# Patient Record
Sex: FEMALE | Race: WHITE | NOT HISPANIC OR LATINO | Employment: FULL TIME | URBAN - NONMETROPOLITAN AREA
[De-identification: names, ages, dates, MRNs, and addresses within clinical notes are randomized per-mention and may not be internally consistent; named-entity substitution may affect disease eponyms.]

---

## 2023-07-02 ENCOUNTER — ANESTHESIA (INPATIENT)
Dept: PERIOP | Facility: HOSPITAL | Age: 58
DRG: 419 | End: 2023-07-02
Payer: COMMERCIAL

## 2023-07-02 ENCOUNTER — APPOINTMENT (EMERGENCY)
Dept: CT IMAGING | Facility: HOSPITAL | Age: 58
DRG: 419 | End: 2023-07-02
Payer: COMMERCIAL

## 2023-07-02 ENCOUNTER — ANESTHESIA EVENT (INPATIENT)
Dept: PERIOP | Facility: HOSPITAL | Age: 58
DRG: 419 | End: 2023-07-02
Payer: COMMERCIAL

## 2023-07-02 ENCOUNTER — HOSPITAL ENCOUNTER (INPATIENT)
Facility: HOSPITAL | Age: 58
LOS: 2 days | Discharge: HOME | DRG: 419 | End: 2023-07-04
Attending: EMERGENCY MEDICINE | Admitting: SURGERY
Payer: COMMERCIAL

## 2023-07-02 DIAGNOSIS — R10.11 RUQ ABDOMINAL PAIN: ICD-10-CM

## 2023-07-02 DIAGNOSIS — K80.12 CALCULUS OF GALLBLADDER WITH ACUTE AND CHRONIC CHOLECYSTITIS WITHOUT OBSTRUCTION: ICD-10-CM

## 2023-07-02 DIAGNOSIS — R52 PAIN: ICD-10-CM

## 2023-07-02 DIAGNOSIS — K81.0 ACUTE CHOLECYSTITIS: Primary | ICD-10-CM

## 2023-07-02 PROBLEM — F41.9 ANXIETY: Status: ACTIVE | Noted: 2023-07-02

## 2023-07-02 PROBLEM — E78.00 HIGH CHOLESTEROL: Status: ACTIVE | Noted: 2023-07-02

## 2023-07-02 LAB
ALBUMIN SERPL BCP-MCNC: 4.2 G/DL (ref 3.5–5)
ALP SERPL-CCNC: 69 U/L (ref 34–104)
ALT SERPL W P-5'-P-CCNC: 50 U/L (ref 7–52)
ANION GAP SERPL CALCULATED.3IONS-SCNC: 11 MMOL/L
AST SERPL W P-5'-P-CCNC: 69 U/L (ref 13–39)
BASOPHILS # BLD AUTO: 0.07 THOUSANDS/ÂΜL (ref 0–0.1)
BASOPHILS NFR BLD AUTO: 1 % (ref 0–1)
BILIRUB SERPL-MCNC: 0.65 MG/DL (ref 0.2–1)
BILIRUB UR QL STRIP: NEGATIVE
BUN SERPL-MCNC: 19 MG/DL (ref 5–25)
CALCIUM SERPL-MCNC: 9.2 MG/DL (ref 8.4–10.2)
CHLORIDE SERPL-SCNC: 104 MMOL/L (ref 96–108)
CLARITY UR: CLEAR
CO2 SERPL-SCNC: 23 MMOL/L (ref 21–32)
COLOR UR: YELLOW
CREAT SERPL-MCNC: 0.69 MG/DL (ref 0.6–1.3)
EOSINOPHIL # BLD AUTO: 0.08 THOUSAND/ÂΜL (ref 0–0.61)
EOSINOPHIL NFR BLD AUTO: 1 % (ref 0–6)
ERYTHROCYTE [DISTWIDTH] IN BLOOD BY AUTOMATED COUNT: 14.5 % (ref 11.6–15.1)
GFR SERPL CREATININE-BSD FRML MDRD: 96 ML/MIN/1.73SQ M
GLUCOSE SERPL-MCNC: 122 MG/DL (ref 65–140)
GLUCOSE UR STRIP-MCNC: NEGATIVE MG/DL
HCT VFR BLD AUTO: 42.6 % (ref 34.8–46.1)
HGB BLD-MCNC: 14.1 G/DL (ref 11.5–15.4)
HGB UR QL STRIP.AUTO: NEGATIVE
IMM GRANULOCYTES # BLD AUTO: 0.1 THOUSAND/UL (ref 0–0.2)
IMM GRANULOCYTES NFR BLD AUTO: 1 % (ref 0–2)
KETONES UR STRIP-MCNC: NEGATIVE MG/DL
LEUKOCYTE ESTERASE UR QL STRIP: NEGATIVE
LIPASE SERPL-CCNC: 28 U/L (ref 11–82)
LYMPHOCYTES # BLD AUTO: 1.83 THOUSANDS/ÂΜL (ref 0.6–4.47)
LYMPHOCYTES NFR BLD AUTO: 12 % (ref 14–44)
MCH RBC QN AUTO: 31.7 PG (ref 26.8–34.3)
MCHC RBC AUTO-ENTMCNC: 33.1 G/DL (ref 31.4–37.4)
MCV RBC AUTO: 96 FL (ref 82–98)
MONOCYTES # BLD AUTO: 1.01 THOUSAND/ÂΜL (ref 0.17–1.22)
MONOCYTES NFR BLD AUTO: 7 % (ref 4–12)
NEUTROPHILS # BLD AUTO: 11.86 THOUSANDS/ÂΜL (ref 1.85–7.62)
NEUTS SEG NFR BLD AUTO: 78 % (ref 43–75)
NITRITE UR QL STRIP: NEGATIVE
NRBC BLD AUTO-RTO: 0 /100 WBCS
PH UR STRIP.AUTO: 7.5 [PH]
PLATELET # BLD AUTO: 363 THOUSANDS/UL (ref 149–390)
PMV BLD AUTO: 9 FL (ref 8.9–12.7)
POTASSIUM SERPL-SCNC: 3.7 MMOL/L (ref 3.5–5.3)
PROT SERPL-MCNC: 7.2 G/DL (ref 6.4–8.4)
PROT UR STRIP-MCNC: NEGATIVE MG/DL
RBC # BLD AUTO: 4.45 MILLION/UL (ref 3.81–5.12)
SODIUM SERPL-SCNC: 138 MMOL/L (ref 135–147)
SP GR UR STRIP.AUTO: 1.01 (ref 1–1.03)
UROBILINOGEN UR QL STRIP.AUTO: 1 E.U./DL
WBC # BLD AUTO: 14.95 THOUSAND/UL (ref 4.31–10.16)

## 2023-07-02 PROCEDURE — 81003 URINALYSIS AUTO W/O SCOPE: CPT | Performed by: EMERGENCY MEDICINE

## 2023-07-02 PROCEDURE — 74177 CT ABD & PELVIS W/CONTRAST: CPT

## 2023-07-02 PROCEDURE — 99284 EMERGENCY DEPT VISIT MOD MDM: CPT

## 2023-07-02 PROCEDURE — 99223 1ST HOSP IP/OBS HIGH 75: CPT | Performed by: SURGERY

## 2023-07-02 PROCEDURE — 96368 THER/DIAG CONCURRENT INF: CPT

## 2023-07-02 PROCEDURE — 80053 COMPREHEN METABOLIC PANEL: CPT | Performed by: EMERGENCY MEDICINE

## 2023-07-02 PROCEDURE — 96367 TX/PROPH/DG ADDL SEQ IV INF: CPT

## 2023-07-02 PROCEDURE — 96365 THER/PROPH/DIAG IV INF INIT: CPT

## 2023-07-02 PROCEDURE — 83690 ASSAY OF LIPASE: CPT | Performed by: EMERGENCY MEDICINE

## 2023-07-02 PROCEDURE — 47562 LAPAROSCOPIC CHOLECYSTECTOMY: CPT

## 2023-07-02 PROCEDURE — G1004 CDSM NDSC: HCPCS

## 2023-07-02 PROCEDURE — 47562 LAPAROSCOPIC CHOLECYSTECTOMY: CPT | Performed by: SURGERY

## 2023-07-02 PROCEDURE — 88304 TISSUE EXAM BY PATHOLOGIST: CPT | Performed by: PATHOLOGY

## 2023-07-02 PROCEDURE — 36415 COLL VENOUS BLD VENIPUNCTURE: CPT | Performed by: EMERGENCY MEDICINE

## 2023-07-02 PROCEDURE — 96375 TX/PRO/DX INJ NEW DRUG ADDON: CPT

## 2023-07-02 PROCEDURE — 0FT44ZZ RESECTION OF GALLBLADDER, PERCUTANEOUS ENDOSCOPIC APPROACH: ICD-10-PCS | Performed by: SURGERY

## 2023-07-02 PROCEDURE — 85025 COMPLETE CBC W/AUTO DIFF WBC: CPT | Performed by: EMERGENCY MEDICINE

## 2023-07-02 RX ORDER — OXYCODONE HYDROCHLORIDE 5 MG/1
5 TABLET ORAL EVERY 4 HOURS PRN
Status: DISCONTINUED | OUTPATIENT
Start: 2023-07-02 | End: 2023-07-04 | Stop reason: HOSPADM

## 2023-07-02 RX ORDER — ACETAMINOPHEN 325 MG/1
650 TABLET ORAL EVERY 6 HOURS PRN
Status: DISCONTINUED | OUTPATIENT
Start: 2023-07-02 | End: 2023-07-04 | Stop reason: HOSPADM

## 2023-07-02 RX ORDER — ONDANSETRON 2 MG/ML
4 INJECTION INTRAMUSCULAR; INTRAVENOUS ONCE AS NEEDED
Status: DISCONTINUED | OUTPATIENT
Start: 2023-07-02 | End: 2023-07-02 | Stop reason: HOSPADM

## 2023-07-02 RX ORDER — KETOROLAC TROMETHAMINE 30 MG/ML
15 INJECTION, SOLUTION INTRAMUSCULAR; INTRAVENOUS ONCE
Status: COMPLETED | OUTPATIENT
Start: 2023-07-02 | End: 2023-07-02

## 2023-07-02 RX ORDER — MAGNESIUM HYDROXIDE 1200 MG/15ML
LIQUID ORAL AS NEEDED
Status: DISCONTINUED | OUTPATIENT
Start: 2023-07-02 | End: 2023-07-02 | Stop reason: HOSPADM

## 2023-07-02 RX ORDER — CEFAZOLIN SODIUM 1 G/3ML
INJECTION, POWDER, FOR SOLUTION INTRAMUSCULAR; INTRAVENOUS AS NEEDED
Status: DISCONTINUED | OUTPATIENT
Start: 2023-07-02 | End: 2023-07-02

## 2023-07-02 RX ORDER — MIDAZOLAM HYDROCHLORIDE 2 MG/2ML
INJECTION, SOLUTION INTRAMUSCULAR; INTRAVENOUS AS NEEDED
Status: DISCONTINUED | OUTPATIENT
Start: 2023-07-02 | End: 2023-07-02

## 2023-07-02 RX ORDER — CEFAZOLIN SODIUM 1 G/50ML
1000 SOLUTION INTRAVENOUS EVERY 8 HOURS
Status: DISCONTINUED | OUTPATIENT
Start: 2023-07-02 | End: 2023-07-02

## 2023-07-02 RX ORDER — HYDROMORPHONE HCL/PF 1 MG/ML
SYRINGE (ML) INJECTION AS NEEDED
Status: DISCONTINUED | OUTPATIENT
Start: 2023-07-02 | End: 2023-07-02

## 2023-07-02 RX ORDER — METRONIDAZOLE 500 MG/100ML
500 INJECTION, SOLUTION INTRAVENOUS ONCE
Status: COMPLETED | OUTPATIENT
Start: 2023-07-02 | End: 2023-07-02

## 2023-07-02 RX ORDER — HYDROMORPHONE HCL/PF 1 MG/ML
0.5 SYRINGE (ML) INJECTION
Status: DISCONTINUED | OUTPATIENT
Start: 2023-07-02 | End: 2023-07-02 | Stop reason: HOSPADM

## 2023-07-02 RX ORDER — CEFAZOLIN SODIUM 2 G/50ML
2000 SOLUTION INTRAVENOUS ONCE
Status: COMPLETED | OUTPATIENT
Start: 2023-07-02 | End: 2023-07-02

## 2023-07-02 RX ORDER — ONDANSETRON 2 MG/ML
4 INJECTION INTRAMUSCULAR; INTRAVENOUS EVERY 6 HOURS PRN
Status: DISCONTINUED | OUTPATIENT
Start: 2023-07-02 | End: 2023-07-04 | Stop reason: HOSPADM

## 2023-07-02 RX ORDER — ALBUTEROL SULFATE 90 UG/1
AEROSOL, METERED RESPIRATORY (INHALATION) AS NEEDED
Status: DISCONTINUED | OUTPATIENT
Start: 2023-07-02 | End: 2023-07-02

## 2023-07-02 RX ORDER — ROCURONIUM BROMIDE 10 MG/ML
INJECTION, SOLUTION INTRAVENOUS AS NEEDED
Status: DISCONTINUED | OUTPATIENT
Start: 2023-07-02 | End: 2023-07-02

## 2023-07-02 RX ORDER — MORPHINE SULFATE 4 MG/ML
4 INJECTION, SOLUTION INTRAMUSCULAR; INTRAVENOUS EVERY 4 HOURS PRN
Status: DISCONTINUED | OUTPATIENT
Start: 2023-07-02 | End: 2023-07-04 | Stop reason: HOSPADM

## 2023-07-02 RX ORDER — DEXAMETHASONE SODIUM PHOSPHATE 10 MG/ML
INJECTION, SOLUTION INTRAMUSCULAR; INTRAVENOUS AS NEEDED
Status: DISCONTINUED | OUTPATIENT
Start: 2023-07-02 | End: 2023-07-02

## 2023-07-02 RX ORDER — SODIUM CHLORIDE, SODIUM LACTATE, POTASSIUM CHLORIDE, CALCIUM CHLORIDE 600; 310; 30; 20 MG/100ML; MG/100ML; MG/100ML; MG/100ML
INJECTION, SOLUTION INTRAVENOUS CONTINUOUS PRN
Status: DISCONTINUED | OUTPATIENT
Start: 2023-07-02 | End: 2023-07-02

## 2023-07-02 RX ORDER — BUPIVACAINE HYDROCHLORIDE 5 MG/ML
INJECTION, SOLUTION EPIDURAL; INTRACAUDAL AS NEEDED
Status: DISCONTINUED | OUTPATIENT
Start: 2023-07-02 | End: 2023-07-02 | Stop reason: HOSPADM

## 2023-07-02 RX ORDER — ONDANSETRON 2 MG/ML
INJECTION INTRAMUSCULAR; INTRAVENOUS AS NEEDED
Status: DISCONTINUED | OUTPATIENT
Start: 2023-07-02 | End: 2023-07-02

## 2023-07-02 RX ORDER — HYDRALAZINE HYDROCHLORIDE 20 MG/ML
5 INJECTION INTRAMUSCULAR; INTRAVENOUS ONCE
Status: COMPLETED | OUTPATIENT
Start: 2023-07-02 | End: 2023-07-02

## 2023-07-02 RX ORDER — OXYCODONE HYDROCHLORIDE 5 MG/1
2.5 TABLET ORAL EVERY 4 HOURS PRN
Status: DISCONTINUED | OUTPATIENT
Start: 2023-07-02 | End: 2023-07-04 | Stop reason: HOSPADM

## 2023-07-02 RX ORDER — DEXTROSE MONOHYDRATE, SODIUM CHLORIDE, AND POTASSIUM CHLORIDE 50; 1.49; 9 G/1000ML; G/1000ML; G/1000ML
100 INJECTION, SOLUTION INTRAVENOUS CONTINUOUS
Status: DISCONTINUED | OUTPATIENT
Start: 2023-07-02 | End: 2023-07-02

## 2023-07-02 RX ORDER — ATORVASTATIN CALCIUM 10 MG/1
10 TABLET, FILM COATED ORAL DAILY
COMMUNITY

## 2023-07-02 RX ORDER — CEFAZOLIN SODIUM 1 G/50ML
1000 SOLUTION INTRAVENOUS
Status: COMPLETED | OUTPATIENT
Start: 2023-07-02 | End: 2023-07-02

## 2023-07-02 RX ORDER — KETOROLAC TROMETHAMINE 30 MG/ML
15 INJECTION, SOLUTION INTRAMUSCULAR; INTRAVENOUS EVERY 6 HOURS SCHEDULED
Status: DISCONTINUED | OUTPATIENT
Start: 2023-07-03 | End: 2023-07-04 | Stop reason: HOSPADM

## 2023-07-02 RX ORDER — FENTANYL CITRATE 50 UG/ML
50 INJECTION, SOLUTION INTRAMUSCULAR; INTRAVENOUS ONCE
Status: COMPLETED | OUTPATIENT
Start: 2023-07-02 | End: 2023-07-02

## 2023-07-02 RX ORDER — MORPHINE SULFATE 4 MG/ML
4 INJECTION, SOLUTION INTRAMUSCULAR; INTRAVENOUS
Status: DISCONTINUED | OUTPATIENT
Start: 2023-07-02 | End: 2023-07-02

## 2023-07-02 RX ORDER — KETOROLAC TROMETHAMINE 30 MG/ML
INJECTION, SOLUTION INTRAMUSCULAR; INTRAVENOUS AS NEEDED
Status: DISCONTINUED | OUTPATIENT
Start: 2023-07-02 | End: 2023-07-02

## 2023-07-02 RX ORDER — PROPOFOL 10 MG/ML
INJECTION, EMULSION INTRAVENOUS AS NEEDED
Status: DISCONTINUED | OUTPATIENT
Start: 2023-07-02 | End: 2023-07-02

## 2023-07-02 RX ORDER — FENTANYL CITRATE 50 UG/ML
50 INJECTION, SOLUTION INTRAMUSCULAR; INTRAVENOUS EVERY 2 HOUR PRN
Status: DISCONTINUED | OUTPATIENT
Start: 2023-07-02 | End: 2023-07-02

## 2023-07-02 RX ORDER — CEFTRIAXONE 1 G/50ML
1000 INJECTION, SOLUTION INTRAVENOUS EVERY 24 HOURS
Status: DISCONTINUED | OUTPATIENT
Start: 2023-07-02 | End: 2023-07-02

## 2023-07-02 RX ORDER — FENTANYL CITRATE/PF 50 MCG/ML
50 SYRINGE (ML) INJECTION
Status: DISCONTINUED | OUTPATIENT
Start: 2023-07-02 | End: 2023-07-02 | Stop reason: HOSPADM

## 2023-07-02 RX ORDER — LIDOCAINE HYDROCHLORIDE 10 MG/ML
INJECTION, SOLUTION EPIDURAL; INFILTRATION; INTRACAUDAL; PERINEURAL AS NEEDED
Status: DISCONTINUED | OUTPATIENT
Start: 2023-07-02 | End: 2023-07-02

## 2023-07-02 RX ADMIN — FENTANYL CITRATE 50 MCG: 50 INJECTION INTRAMUSCULAR; INTRAVENOUS at 15:50

## 2023-07-02 RX ADMIN — MIDAZOLAM 2 MG: 1 INJECTION INTRAMUSCULAR; INTRAVENOUS at 14:00

## 2023-07-02 RX ADMIN — ALBUTEROL SULFATE 2 PUFF: 90 AEROSOL, METERED RESPIRATORY (INHALATION) at 14:14

## 2023-07-02 RX ADMIN — HYDROMORPHONE HYDROCHLORIDE 0.5 MG: 1 INJECTION, SOLUTION INTRAMUSCULAR; INTRAVENOUS; SUBCUTANEOUS at 14:24

## 2023-07-02 RX ADMIN — LIDOCAINE HYDROCHLORIDE 50 MG: 10 INJECTION, SOLUTION EPIDURAL; INFILTRATION; INTRACAUDAL; PERINEURAL at 14:08

## 2023-07-02 RX ADMIN — ROCURONIUM BROMIDE 50 MG: 10 INJECTION INTRAVENOUS at 14:08

## 2023-07-02 RX ADMIN — KETOROLAC TROMETHAMINE 15 MG: 30 INJECTION, SOLUTION INTRAMUSCULAR at 23:13

## 2023-07-02 RX ADMIN — KETOROLAC TROMETHAMINE 30 MG: 30 INJECTION, SOLUTION INTRAMUSCULAR at 14:08

## 2023-07-02 RX ADMIN — FENTANYL CITRATE 50 MCG: 50 INJECTION INTRAMUSCULAR; INTRAVENOUS at 00:44

## 2023-07-02 RX ADMIN — HYDRALAZINE HYDROCHLORIDE 5 MG: 20 INJECTION INTRAMUSCULAR; INTRAVENOUS at 15:36

## 2023-07-02 RX ADMIN — DEXAMETHASONE SODIUM PHOSPHATE 10 MG: 10 INJECTION, SOLUTION INTRAMUSCULAR; INTRAVENOUS at 14:08

## 2023-07-02 RX ADMIN — DEXTROSE, SODIUM CHLORIDE, AND POTASSIUM CHLORIDE 100 ML/HR: 5; .9; .15 INJECTION INTRAVENOUS at 03:36

## 2023-07-02 RX ADMIN — MORPHINE SULFATE 2 MG: 2 INJECTION, SOLUTION INTRAMUSCULAR; INTRAVENOUS at 22:03

## 2023-07-02 RX ADMIN — SODIUM CHLORIDE, SODIUM LACTATE, POTASSIUM CHLORIDE, AND CALCIUM CHLORIDE 1000 ML: .6; .31; .03; .02 INJECTION, SOLUTION INTRAVENOUS at 00:44

## 2023-07-02 RX ADMIN — PROPOFOL 200 MG: 10 INJECTION, EMULSION INTRAVENOUS at 14:08

## 2023-07-02 RX ADMIN — OXYCODONE HYDROCHLORIDE 5 MG: 5 TABLET ORAL at 20:11

## 2023-07-02 RX ADMIN — IOHEXOL 99 ML: 350 INJECTION, SOLUTION INTRAVENOUS at 01:43

## 2023-07-02 RX ADMIN — FENTANYL CITRATE 50 MCG: 50 INJECTION INTRAMUSCULAR; INTRAVENOUS at 11:53

## 2023-07-02 RX ADMIN — SUGAMMADEX 200 MG: 100 INJECTION, SOLUTION INTRAVENOUS at 14:50

## 2023-07-02 RX ADMIN — FENTANYL CITRATE 100 MCG: 50 INJECTION INTRAMUSCULAR; INTRAVENOUS at 14:08

## 2023-07-02 RX ADMIN — CEFAZOLIN 1000 MG: 1 INJECTION, POWDER, FOR SOLUTION INTRAMUSCULAR; INTRAVENOUS at 14:00

## 2023-07-02 RX ADMIN — FENTANYL CITRATE 50 MCG: 50 INJECTION INTRAMUSCULAR; INTRAVENOUS at 15:25

## 2023-07-02 RX ADMIN — ALBUTEROL SULFATE 2 PUFF: 90 AEROSOL, METERED RESPIRATORY (INHALATION) at 14:16

## 2023-07-02 RX ADMIN — SODIUM CHLORIDE, SODIUM LACTATE, POTASSIUM CHLORIDE, AND CALCIUM CHLORIDE: .6; .31; .03; .02 INJECTION, SOLUTION INTRAVENOUS at 14:00

## 2023-07-02 RX ADMIN — METRONIDAZOLE 500 MG: 500 INJECTION, SOLUTION INTRAVENOUS at 03:41

## 2023-07-02 RX ADMIN — CEFAZOLIN SODIUM 1000 MG: 1 SOLUTION INTRAVENOUS at 14:00

## 2023-07-02 RX ADMIN — CEFTRIAXONE 1000 MG: 1 INJECTION, SOLUTION INTRAVENOUS at 11:43

## 2023-07-02 RX ADMIN — SODIUM CHLORIDE, SODIUM LACTATE, POTASSIUM CHLORIDE, AND CALCIUM CHLORIDE: .6; .31; .03; .02 INJECTION, SOLUTION INTRAVENOUS at 14:42

## 2023-07-02 RX ADMIN — CEFAZOLIN SODIUM 2000 MG: 2 SOLUTION INTRAVENOUS at 03:22

## 2023-07-02 RX ADMIN — ONDANSETRON 4 MG: 2 INJECTION INTRAMUSCULAR; INTRAVENOUS at 14:08

## 2023-07-02 NOTE — DISCHARGE INSTR - AVS FIRST PAGE
Laparoscopic Cholecystectomy   WHAT YOU NEED TO KNOW:   Laparoscopic cholecystectomy is surgery to remove your gallbladder. DISCHARGE INSTRUCTIONS:   Call Dr. Darlene Carranza office at [phone] with any questions or concerns    Medicines: You may need any of the following:  Prescription pain medicine - Take as directed    You may also take tylenol as needed    Take your medicine as directed. Contact your healthcare provider if you think your medicine is not helping or if you have side effects. Tell her if you are allergic to any medicine. Keep a list of the medicines, vitamins, and herbs you take. Include the amounts, and when and why you take them. Bring the list or the pill bottles to follow-up visits. Carry your medicine list with you in case of an emergency. Follow up with your healthcare provider 2 weeks after surgery, or as directed:  Write down your questions so you remember to ask them during your visits. Wound care: You may shower and pat the incisions dry. Do not soak underwater for 2 weeks   What to eat after surgery: You may eat whatever you feel up to following surgery. You may notice diarrhea with fatty foods. Drink plenty of liquids. When to return to work and other activities:  No lifting, pushing, or pulling greater then 10lb for 2 weeks. Walk as much as possible. YOU may drive when you are comfortable enough to turn and press the brake without pain medication    Contact your healthcare provider if:   You have a fever over 101°F (38°C) or chills. You have pain or nausea that is not relieved by medicine. You have redness and swelling around your incisions, or blood or pus is leaking from your incisions. You are constipated or have diarrhea. Your skin or eyes are yellow, or your bowel movements are pale. You have questions or concerns about your surgery, condition, or care. Seek care immediately or call 911 if:   You cannot stop vomiting.      Your bowel movements are black or bloody. You have pain in your abdomen and it is swollen or hard. Your arm or leg feels warm, tender, and painful. It may look swollen and red. You feel lightheaded, short of breath, and have chest pain. You cough up blood. © 2017 5 [facility] Information is for End User's use only and may not be sold, redistributed or otherwise used for commercial purposes. All illustrations and images included in CareNotes® are the copyrighted property of [employer] or Geronimo Oreilly. The above information is an  only. It is not intended as medical advice for individual conditions or treatments. Talk to your doctor, nurse or pharmacist before following any medical regimen to see if it is safe and effective for you.

## 2023-07-02 NOTE — PLAN OF CARE
Problem: PAIN - ADULT  Goal: Verbalizes/displays adequate comfort level or baseline comfort level  Description: Interventions:  - Encourage patient to monitor pain and request assistance  - Assess pain using appropriate pain scale  - Administer analgesics based on type and severity of pain and evaluate response  - Implement non-pharmacological measures as appropriate and evaluate response  - Consider cultural and social influences on pain and pain management  - Notify physician/advanced practitioner if interventions unsuccessful or patient reports new pain  Outcome: Progressing     Problem: INFECTION - ADULT  Goal: Absence or prevention of progression during hospitalization  Description: INTERVENTIONS:  - Assess and monitor for signs and symptoms of infection  - Monitor lab/diagnostic results  - Monitor all insertion sites, i.e. indwelling lines, tubes, and drains  - Monitor endotracheal if appropriate and nasal secretions for changes in amount and color  - [place] appropriate cooling/warming therapies per order  - Administer medications as ordered  - Instruct and encourage patient and family to use good hand hygiene technique  - Identify and instruct in appropriate isolation precautions for identified infection/condition  Outcome: Progressing  Goal: Absence of fever/infection during neutropenic period  Description: INTERVENTIONS:  - Monitor WBC    Outcome: Progressing     Problem: SAFETY ADULT  Goal: Patient will remain free of falls  Description: INTERVENTIONS:  - Educate patient/family on patient safety including physical limitations  - Instruct patient to call for assistance with activity   - Consult OT/PT to assist with strengthening/mobility   - Keep Call bell within reach  - Keep bed low and locked with side rails adjusted as appropriate  - Keep care items and personal belongings within reach  - Initiate and maintain comfort rounds  - Make Fall Risk Sign visible to staff  - Offer Toileting every .  Hours, in advance of need  - Initiate/Maintain . alarm  - Obtain necessary fall risk management equipment: . - Apply yellow socks and bracelet for high fall risk patients  - Consider moving patient to room near nurses station  Outcome: Progressing  Goal: Maintain or return to baseline ADL function  Description: INTERVENTIONS:  -  Assess patient's ability to carry out ADLs; assess patient's baseline for ADL function and identify physical deficits which impact ability to perform ADLs (bathing, care of mouth/teeth, toileting, grooming, dressing, etc.)  - Assess/evaluate cause of self-care deficits   - Assess range of motion  - Assess patient's mobility; develop plan if impaired  - Assess patient's need for assistive devices and provide as appropriate  - Encourage maximum independence but intervene and supervise when necessary  - Involve family in performance of ADLs  - Assess for home care needs following discharge   - Consider OT consult to assist with ADL evaluation and planning for discharge  - Provide patient education as appropriate  Outcome: Progressing  Goal: Maintains/Returns to pre admission functional level  Description: INTERVENTIONS:  - Perform BMAT or MOVE assessment daily.   - Set and communicate daily mobility goal to care team and patient/family/caregiver. - Collaborate with rehabilitation services on mobility goals if consulted  - Perform Range of Motion . times a day. - Reposition patient every . hours. - Dangle patient . times a day  - Stand patient . times a day  - Ambulate patient . times a day  - Out of bed to chair . Danny Piety ... times a day   - Out of bed for meals .  times a day  - Out of bed for toileting  - Record patient progress and toleration of activity level   Outcome: Progressing     Problem: DISCHARGE PLANNING  Goal: Discharge to home or other facility with appropriate resources  Description: INTERVENTIONS:  - Identify barriers to discharge w/patient and caregiver  - Arrange for needed discharge resources and transportation as appropriate  - Identify discharge learning needs (meds, wound care, etc.)  - Arrange for interpretive services to assist at discharge as needed  - Refer to Case Management Department for coordinating discharge planning if the patient needs post-hospital services based on physician/advanced practitioner order or complex needs related to functional status, cognitive ability, or social support system  Outcome: Progressing     Problem: Knowledge Deficit  Goal: Patient/family/caregiver demonstrates understanding of disease process, treatment plan, medications, and discharge instructions  Description: Complete learning assessment and assess knowledge base. Interventions:  - Provide teaching at level of understanding  - Provide teaching via preferred learning methods  Outcome: Progressing     Problem: MOBILITY - ADULT  Goal: Maintain or return to baseline ADL function  Description: INTERVENTIONS:  -  Assess patient's ability to carry out ADLs; assess patient's baseline for ADL function and identify physical deficits which impact ability to perform ADLs (bathing, care of mouth/teeth, toileting, grooming, dressing, etc.)  - Assess/evaluate cause of self-care deficits   - Assess range of motion  - Assess patient's mobility; develop plan if impaired  - Assess patient's need for assistive devices and provide as appropriate  - Encourage maximum independence but intervene and supervise when necessary  - Involve family in performance of ADLs  - Assess for home care needs following discharge   - Consider OT consult to assist with ADL evaluation and planning for discharge  - Provide patient education as appropriate  Outcome: Progressing  Goal: Maintains/Returns to pre admission functional level  Description: INTERVENTIONS:  - Perform BMAT or MOVE assessment daily.   - Set and communicate daily mobility goal to care team and patient/family/caregiver.    - Collaborate with rehabilitation services on mobility goals if consulted  - Perform Range of Motion . times a day. - Reposition patient every . hours. - Dangle patient . times a day  - Stand patient . times a day  - Ambulate patient . times a day  - Out of bed to chair . times a day   - Out of bed for meals .  times a day  - Out of bed for toileting  - Record patient progress and toleration of activity level   Outcome: Progressing

## 2023-07-02 NOTE — ANESTHESIA POSTPROCEDURE EVALUATION
Post-Op Assessment Note    CV Status:  Stable  Pain Score: 0    Pain management: adequate     Mental Status:  Alert and awake   Hydration Status:  Euvolemic   PONV Controlled:  Controlled   Airway Patency:  Patent      Post Op Vitals Reviewed: Yes      Staff: CRNA         No notable events documented.     BP (!) 183/82 (07/02/23 1505)    Temp 97.8 °F (36.6 °C) (07/02/23 1505)    Pulse 88 (07/02/23 1505)   Resp 18 (07/02/23 1505)    SpO2 99 % (07/02/23 1505)

## 2023-07-02 NOTE — PLAN OF CARE
Problem: PAIN - ADULT  Goal: Verbalizes/displays adequate comfort level or baseline comfort level  Description: Interventions:  - Encourage patient to monitor pain and request assistance  - Assess pain using appropriate pain scale  - Administer analgesics based on type and severity of pain and evaluate response  - Implement non-pharmacological measures as appropriate and evaluate response  - Consider cultural and social influences on pain and pain management  - Notify physician/advanced practitioner if interventions unsuccessful or patient reports new pain  Outcome: Progressing     Problem: INFECTION - ADULT  Goal: Absence or prevention of progression during hospitalization  Description: INTERVENTIONS:  - Assess and monitor for signs and symptoms of infection  - Monitor lab/diagnostic results  - Monitor all insertion sites, i.e. indwelling lines, tubes, and drains  - Monitor endotracheal if appropriate and nasal secretions for changes in amount and color  - [place] appropriate cooling/warming therapies per order  - Administer medications as ordered  - Instruct and encourage patient and family to use good hand hygiene technique  - Identify and instruct in appropriate isolation precautions for identified infection/condition  Outcome: Progressing  Goal: Absence of fever/infection during neutropenic period  Description: INTERVENTIONS:  - Monitor WBC    Outcome: Progressing     Problem: SAFETY ADULT  Goal: Patient will remain free of falls  Description: INTERVENTIONS:  - Educate patient/family on patient safety including physical limitations  - Instruct patient to call for assistance with activity   - Consult OT/PT to assist with strengthening/mobility   - Keep Call bell within reach  - Keep bed low and locked with side rails adjusted as appropriate  - Keep care items and personal belongings within reach  - Initiate and maintain comfort rounds  - Make Fall Risk Sign visible to staff  - Apply yellow socks and bracelet for high fall risk patients  - Consider moving patient to room near nurses station  Outcome: Progressing  Goal: Maintain or return to baseline ADL function  Description: INTERVENTIONS:  -  Assess patient's ability to carry out ADLs; assess patient's baseline for ADL function and identify physical deficits which impact ability to perform ADLs (bathing, care of mouth/teeth, toileting, grooming, dressing, etc.)  - Assess/evaluate cause of self-care deficits   - Assess range of motion  - Assess patient's mobility; develop plan if impaired  - Assess patient's need for assistive devices and provide as appropriate  - Encourage maximum independence but intervene and supervise when necessary  - Involve family in performance of ADLs  - Assess for home care needs following discharge   - Consider OT consult to assist with ADL evaluation and planning for discharge  - Provide patient education as appropriate  Outcome: Progressing  Goal: Maintains/Returns to pre admission functional level  Description: INTERVENTIONS:  - Perform BMAT or MOVE assessment daily.   - Set and communicate daily mobility goal to care team and patient/family/caregiver.    - Collaborate with rehabilitation services on mobility goals if consulted  - Out of bed for toileting  - Record patient progress and toleration of activity level   Outcome: Progressing     Problem: DISCHARGE PLANNING  Goal: Discharge to home or other facility with appropriate resources  Description: INTERVENTIONS:  - Identify barriers to discharge w/patient and caregiver  - Arrange for needed discharge resources and transportation as appropriate  - Identify discharge learning needs (meds, wound care, etc.)  - Arrange for interpretive services to assist at discharge as needed  - Refer to Case Management Department for coordinating discharge planning if the patient needs post-hospital services based on physician/advanced practitioner order or complex needs related to functional status, cognitive ability, or social support system  Outcome: Progressing     Problem: Knowledge Deficit  Goal: Patient/family/caregiver demonstrates understanding of disease process, treatment plan, medications, and discharge instructions  Description: Complete learning assessment and assess knowledge base.   Interventions:  - Provide teaching at level of understanding  - Provide teaching via preferred learning methods  Outcome: Progressing

## 2023-07-02 NOTE — ANESTHESIA PREPROCEDURE EVALUATION
Procedure:  CHOLECYSTECTOMY LAPAROSCOPIC (Abdomen)    Relevant Problems   CARDIO   (+) High cholesterol      NEURO/PSYCH   (+) Anxiety             Anesthesia Plan  ASA Score- 2     Anesthesia Type- general with ASA Monitors. Additional Monitors:   Airway Plan: ETT. Plan Factors-    Chart reviewed. Induction- intravenous. Postoperative Plan-     Informed Consent- Anesthetic plan and risks discussed with patient. I personally reviewed this patient with the CRNA. Discussed and agreed on the Anesthesia Plan with the CRNA. Romulo Thomas

## 2023-07-02 NOTE — OP NOTE
OPERATIVE REPORT  PATIENT NAME: Ramon Grove    :  1965  MRN: [id number]  Pt Location: [facility] OR ROOM 01    SURGERY DATE: 2023    Surgeon(s) and Role:     * Dony Shah MD - Primary     * Randon Ormond, PA-C - Assisting  The PA was necessary to provide expert assistance; i.e. in the form of providing optimal exposure with retraction, suturing, and assistance with dissection in order to perform the most efficient operation and in order to optimize patient safety in the abscence of a qualified surgical resident. Preop Diagnosis:  Acute cholecystitis [K81.0]    Post-Op Diagnosis Codes:     * Calculus of gallbladder with acute and chronic cholecystitis without obstruction [K80.12]    Procedure(s):  CHOLECYSTECTOMY LAPAROSCOPIC    Specimen(s):  ID Type Source Tests Collected by Time Destination   1 : gall bladder Tissue Gallbladder TISSUE Adandriaa Shante Shah MD 2023 [id number]        Estimated Blood Loss:   Minimal    Drains:  * No LDAs found *    Anesthesia Type:   General    Operative Indications:  Acute cholecystitis [K81.0]  Patient is a pleasant 51-year-old female presenting with signs and symptoms of acute calculus cholecystitis for which definitive treatment by laparoscopic cholecystectomy is now indicated. Operative Findings:  The patient was found to have acute on chronic calculous cholecystitis. Laparoscopic cholecystectomy was performed in the routine fashion. During the course the dissection the critical structures defined. The cystic duct dissected out circumferentially. The cystic artery dissected out circumferentially. The cholecystectomy completed laparoscopically. The patient tolerated the procedure well. Complications:   None    Procedure and Technique:  The patient was taken to the operating room where they were properly identified, monitored and anesthetized. They received antibiotics perioperatively.  Venodyne's were placed prior to the induction of anesthesia for DVT prophylaxis. The abdomen prepped and draped under sterile conditions using aseptic technique. Timeout performed. Skin incised in the right upper quadrant. . Peritoneal cavity entered bluntly with a 5 mm trocar. Pneumoperitoneum established to 15 mmHg. 4 quadrants of the abdomen and inspected laparoscopically and no additional pathology was identified. 3 additional working ports placed. These were an 11 mm port in the epigastrium and 2 additional 5 mm ports in the right upper quadrant. The patient placed in reverse Trendelenburg, left side down. Gallbladder grasped at its dome retracted cephalad and to the right. Infundibulum grasped and retracted laterally. [place] of [place] defined and skeletonized. Cystic duct dissected out circumferentially. Cystic artery dissected out circumferentially. Cystic artery clipped below and divided above with harmonic shreya. A clip placed on the up side of the cystic duct. Cystic duct clipped twice on the down side and divided between clips. Gallbladder dissected off the liver with harmonic shreya. The gallbladder was placed in an Endobag and delivered through the epigastric trocar site. Pneumoperitoneum reestablished to 15 mmHg. Scope advanced and the right upper quadrant inspected. Good hemostasis found. The fascial defect at the epigastrium closed with an 0 Vicryl. Ports removed. Skin closed with subcuticular 4-0 Monocryl suture. Wounds infiltrated with half percent Marcaine. The wounds dressed. The patient extubated and taken to recovery in stable condition. I was present for the entire procedure.     Patient Disposition:  PACU         SIGNATURE: Eileen Lance MD  DATE: July 2, 2023  TIME: 2:51 PM

## 2023-07-02 NOTE — ED PROVIDER NOTES
History  Chief Complaint   Patient presents with   • Abdominal Pain     Pt BIBA from home with reports of RUQ pain that started approx 2 hours ago. Per ems pt with rebound tenderness. Pt denies falls or trauma to abdomen. Hx of ruptured spleen. Pt given 1.25mg of droperidol PTA       History provided by:  Medical records, patient and EMS personnel  Abdominal Pain  Pain location:  RUQ  Pain quality: aching and dull    Pain radiation: Right scapular region. Pain severity:  Moderate  Onset quality:  Sudden  Duration:  5 hours  Timing:  Constant  Progression:  Unchanged  Chronicity:  New  Context comment:  Patient with sudden onset of right upper quadrant abdominal pain radiating to the right scapular region, shortly after playing with grandchild at 7:30 PM  Relieved by: Prehospital droperidol by EMS. Worsened by:  Nothing  Ineffective treatments:  None tried  Associated symptoms: nausea and vomiting    Associated symptoms: no chest pain, no chills, no cough, no diarrhea, no dysuria, no fatigue, no fever, no hematuria, no shortness of breath and no sore throat        None       No past medical history on file. No past surgical history on file. No family history on file. I have reviewed and agree with the history as documented. E-Cigarette/Vaping     E-Cigarette/Vaping Substances     Social History     Tobacco Use   • Smoking status: Former     Packs/day: 0.50     Types: Cigarettes   • Smokeless tobacco: Never   Substance Use Topics   • Alcohol use: Yes     Comment: usually on the weekends   • Drug use: Never       Review of Systems   Constitutional: Negative for chills, fatigue and fever. HENT: Negative for ear discharge, ear pain, rhinorrhea and sore throat. Eyes: Negative for pain and visual disturbance. Respiratory: Negative for cough and shortness of breath. Cardiovascular: Negative for chest pain and palpitations. Gastrointestinal: Positive for abdominal pain, nausea and vomiting.  Negative for diarrhea. Endocrine: Negative for polydipsia, polyphagia and polyuria. Genitourinary: Negative for difficulty urinating, dysuria, flank pain and hematuria. Musculoskeletal: Negative for arthralgias and back pain. Skin: Negative for color change and rash. Allergic/Immunologic: Negative for immunocompromised state. Neurological: Negative for dizziness, seizures, syncope, weakness and headaches. Psychiatric/Behavioral: Negative for confusion and self-injury. The patient is not nervous/anxious. All other systems reviewed and are negative. Physical Exam  Physical Exam  Vitals and nursing note reviewed. Constitutional:       General: She is not in acute distress. Appearance: Normal appearance. She is not ill-appearing, toxic-appearing or diaphoretic. HENT:      Head: Normocephalic and atraumatic. Nose: Nose normal. No congestion or rhinorrhea. Mouth/Throat:      Mouth: Mucous membranes are moist.      Pharynx: Oropharynx is clear. No oropharyngeal exudate or posterior oropharyngeal erythema. Eyes:      General:         Right eye: No discharge. Left eye: No discharge. Cardiovascular:      Rate and Rhythm: Normal rate and regular rhythm. Pulses: Normal pulses. Heart sounds: Normal heart sounds. No murmur heard. No gallop. Pulmonary:      Effort: Pulmonary effort is normal. No respiratory distress. Breath sounds: Normal breath sounds. No stridor. No wheezing, rhonchi or rales. Chest:      Chest wall: No tenderness. Abdominal:      General: Bowel sounds are normal. There is no distension. Palpations: Abdomen is soft. There is no mass. Tenderness: There is abdominal tenderness in the right upper quadrant. There is no right CVA tenderness, left CVA tenderness, guarding or rebound. Hernia: No hernia is present. Musculoskeletal:         General: Normal range of motion. Cervical back: Normal range of motion and neck supple. Skin:     General: Skin is warm and dry. Capillary Refill: Capillary refill takes less than 2 seconds. Neurological:      General: No focal deficit present. Mental Status: She is alert and oriented to person, place, and time. Cranial Nerves: No cranial nerve deficit. Sensory: No sensory deficit. Motor: No weakness. Coordination: Coordination normal.      Gait: Gait normal.      Deep Tendon Reflexes: Reflexes normal.   Psychiatric:         Mood and Affect: Mood normal.         Behavior: Behavior normal.         Thought Content:  Thought content normal.         Judgment: Judgment normal.         Vital Signs  ED Triage Vitals [07/02/23 0037]   Temperature Pulse Respirations Blood Pressure SpO2   (!) 96.8 °F (36 °C) 88 18 163/71 97 %      Temp Source Heart Rate Source Patient Position - Orthostatic VS BP Location FiO2 (%)   Temporal Monitor Lying Left arm --      Pain Score       3           Vitals:    07/02/23 0037 07/02/23 0330   BP: 163/71 135/62   Pulse: 88 88   Patient Position - Orthostatic VS: Lying Lying         Visual Acuity      ED Medications  Medications   fentanyl citrate (PF) 100 MCG/2ML 50 mcg (has no administration in time range)   dextrose 5 % and sodium chloride 0.9 % with KCl 20 mEq/L infusion (premix) (100 mL/hr Intravenous New Bag 7/2/23 0336)   lactated ringers bolus 1,000 mL (0 mL Intravenous Stopped 7/2/23 0144)   fentanyl citrate (PF) 100 MCG/2ML 50 mcg (50 mcg Intravenous Given 7/2/23 0044)   iohexol (OMNIPAQUE) 350 MG/ML injection (SINGLE-DOSE) 99 mL (99 mL Intravenous Given 7/2/23 0143)   ceFAZolin (ANCEF) IVPB (premix in dextrose) 2,000 mg 50 mL (0 mg Intravenous Stopped 7/2/23 0341)   metroNIDAZOLE (FLAGYL) IVPB (premix) 500 mg 100 mL (0 mg Intravenous Stopped 7/2/23 0411)       Diagnostic Studies  Results Reviewed     Procedure Component Value Units Date/Time    UA (URINE) with reflex to Scope [[id number]] Collected: 07/02/23 0345    Lab Status: Final result Specimen: Urine, Clean Catch Updated: 07/02/23 0404     Color, UA Yellow     Clarity, UA Clear     Specific Gravity, UA 1.010     pH, UA 7.5     Leukocytes, UA Negative     Nitrite, UA Negative     Protein, UA Negative mg/dl      Glucose, UA Negative mg/dl      Ketones, UA Negative mg/dl      Urobilinogen, UA 1.0 E.U./dl      Bilirubin, UA Negative     Occult Blood, UA Negative    Comprehensive metabolic panel [[id number]]  (Abnormal) Collected: 07/02/23 0043    Lab Status: Final result Specimen: Blood from Arm, Left Updated: 07/02/23 0121     Sodium 138 mmol/L      Potassium 3.7 mmol/L      Chloride 104 mmol/L      CO2 23 mmol/L      ANION GAP 11 mmol/L      BUN 19 mg/dL      Creatinine 0.69 mg/dL      Glucose 122 mg/dL      Calcium 9.2 mg/dL      AST 69 U/L      ALT 50 U/L      Alkaline Phosphatase 69 U/L      Total Protein 7.2 g/dL      Albumin 4.2 g/dL      Total Bilirubin 0.65 mg/dL      eGFR 96 ml/min/1.73sq m     Narrative:      [place] guidelines for Chronic Kidney Disease (CKD):   •  Stage 1 with normal or high GFR (GFR > 90 mL/min/1.73 square meters)  •  Stage 2 Mild CKD (GFR = 60-89 mL/min/1.73 square meters)  •  Stage 3A Moderate CKD (GFR = 45-59 mL/min/1.73 square meters)  •  Stage 3B Moderate CKD (GFR = 30-44 mL/min/1.73 square meters)  •  Stage 4 Severe CKD (GFR = 15-29 mL/min/1.73 square meters)  •  Stage 5 End Stage CKD (GFR <15 mL/min/1.73 square meters)  Note: GFR calculation is accurate only with a steady state creatinine    Lipase [[id number]]  (Normal) Collected: 07/02/23 0043    Lab Status: Final result Specimen: Blood from Arm, Left Updated: 07/02/23 0121     Lipase 28 u/L     CBC and differential [[id number]]  (Abnormal) Collected: 07/02/23 0043    Lab Status: Final result Specimen: Blood from Arm, Left Updated: 07/02/23 0052     WBC 14.95 Thousand/uL      RBC 4.45 Million/uL      Hemoglobin 14.1 g/dL      Hematocrit 42.6 %      MCV 96 fL      MCH 31.7 pg      MCHC 33.1 g/dL      RDW 14.5 %      MPV 9.0 fL      Platelets 450 Thousands/uL      nRBC 0 /100 WBCs      Neutrophils Relative 78 %      Immat GRANS % 1 %      Lymphocytes Relative 12 %      Monocytes Relative 7 %      Eosinophils Relative 1 %      Basophils Relative 1 %      Neutrophils Absolute 11.86 Thousands/µL      Immature Grans Absolute 0.10 Thousand/uL      Lymphocytes Absolute 1.83 Thousands/µL      Monocytes Absolute 1.01 Thousand/µL      Eosinophils Absolute 0.08 Thousand/µL      Basophils Absolute 0.07 Thousands/µL                  CT abdomen pelvis with contrast   Final Result by Christiana Casillas MD (07/02 8276)      Findings above suspicious for acute cholecystitis. Surgical consult advised. Study was marked in EPIC for significant notification. Workstation performed: [id number]                    Procedures  Procedures         ED Course                               SBIRT 22yo+    Flowsheet Row Most Recent Value   Initial Alcohol Screen: US AUDIT-C     1. How often do you have a drink containing alcohol? 0 Filed at: 07/02/2023 0037   2. How many drinks containing alcohol do you have on a typical day you are drinking? 0 Filed at: 07/02/2023 0037   3a. Male UNDER 65: How often do you have five or more drinks on one occasion? 0 Filed at: 07/02/2023 0037   3b. FEMALE Any Age, or MALE 65+: How often do you have 4 or more drinks on one occassion? 0 Filed at: 07/02/2023 0037   Audit-C Score 0 Filed at: 07/02/2023 3942   TRA: How many times in the past year have you. .. Used an illegal drug or used a prescription medication for non-medical reasons? Never Filed at: 07/02/2023 0037                    Medical Decision Making  0033: Patient appears well, vital signs reviewed. Plan to complete basic labs including urinalysis. Plan to complete CT abdomen pelvis. I will give analgesics for her discomfort and reevaluate. 0300: CT and labs reviewed.   I will consult general surgery for assistance with care.    Acute cholecystitis: acute illness or injury  RUQ abdominal pain: acute illness or injury  Amount and/or Complexity of Data Reviewed  Labs: ordered. Radiology: ordered and independent interpretation performed. Details: CT abdomen pelvis      Risk  Prescription drug management. Decision regarding hospitalization. Disposition  Final diagnoses:   Acute cholecystitis   RUQ abdominal pain     Time reflects when diagnosis was documented in both MDM as applicable and the Disposition within this note     Time User Action Codes Description Comment    7/2/2023  3:06 AM Leeroy Ganser Add [K81.0] Acute cholecystitis     7/2/2023  3:06 AM Leeroy Ganser Add [R10.11] RUQ abdominal pain       ED Disposition     ED Disposition   Admit    Condition   Stable    Date/Time   Sun Jul 2, 2023  3:06 AM    Comment              Follow-up Information    None         Patient's Medications    No medications on file       No discharge procedures on file.     PDMP Review     None          ED Provider  Electronically Signed by           Jm Scott MD  07/02/23 3732

## 2023-07-02 NOTE — H&P
H&P - General Surgery   Frantz Foley 62 y.o. female MRN: [id number]  Unit/Bed#: -01 Encounter: [id number]  Assessment:  62 y.o. female with acute cholecystitis     CTAP:  - No calcified gallstones, subtle pericholecystic edema and haziness, suspicious for acute cholecystitis     - Afebrile, VSS on room air  - WBC 14   - Hgb 14  - AST 69, ALT 50, Alk Phos 69  - Tbili 0.65  - Lipase 28  - Hx anxiety, insomnia, HLD  - Surghx splenic laceration with IR embolization 1/2021    Plan:  - OR today for laparoscopic cholecystectomy  - NPO   - IVF hydration   - IV abx with Ancef on call to OR  - Pain/nausea control PRN  - OOB ambulating ad jessica    History of Present Illness   Chief Complaint: abdominal pain, nausea, vomiting    HPI:  Frantz Foley is a 62 y.o. female with past medical history significant for HLD, anxiety, insomnia, with splenic laceration and IR embolization 1/2021 who initially presented to [facility] ED with complaints of RUQ pain. This began around 6PM following dinner. Patient reports she had associated nausea and vomiting without fevers or chills. She did experience some radiation of pain into the right shoulder. Patient feels since arrival she has had improvement in pain with pain medication. She states she hasn't had recent BM but has been urinating without issue. She denies any prior episodes of similar pain or hx of issues with gallbladder. Review of Systems   Constitutional: Positive for appetite change. Negative for chills and fever. HENT: Negative. Respiratory: Negative for chest tightness and shortness of breath. Cardiovascular: Negative for chest pain and palpitations. Gastrointestinal: Positive for abdominal pain (RUQ into right shoulder), constipation, nausea and vomiting. Negative for abdominal distention and diarrhea. Endocrine: Negative. Genitourinary: Negative for difficulty urinating, dysuria, frequency and urgency. Musculoskeletal: Negative for arthralgias and myalgias. Skin: Negative. Neurological: Negative for dizziness, weakness and headaches. Psychiatric/Behavioral: Negative. Historical Information   No past medical history on file. No past surgical history on file. Social History   Social History     Substance and Sexual Activity   Alcohol Use Yes    Comment: usually on the weekends     Social History     Substance and Sexual Activity   Drug Use Never     Social History     Tobacco Use   Smoking Status Former   • Packs/day: 0.50   • Types: Cigarettes   Smokeless Tobacco Never     E-Cigarette/Vaping     E-Cigarette/Vaping Substances     Family History: non-contributory    Meds/Allergies   PTA meds:   Prior to Admission Medications   Prescriptions Last Dose Informant Patient Reported?  Taking?   atorvastatin (LIPITOR) 10 mg tablet 7/1/2023 at 1800 Self Yes Yes   Sig: Take 10 mg by mouth daily      Facility-Administered Medications: None     No Known Allergies    Objective   First Vitals:   Blood Pressure: 163/71 (07/02/23 0037)  Pulse: 88 (07/02/23 0037)  Temperature: (!) 96.8 °F (36 °C) (07/02/23 0037)  Temp Source: Temporal (07/02/23 0037)  Respirations: 18 (07/02/23 0037)  Height: 5' 2" (157.5 cm) (07/02/23 0037)  Weight - Scale: 77.7 kg (171 lb 4.8 oz) (07/02/23 0037)  SpO2: 97 % (07/02/23 0037)    Current Vitals:   Blood Pressure: 140/78 (07/02/23 0750)  Pulse: 82 (07/02/23 0750)  Temperature: (!) 97.3 °F (36.3 °C) (07/02/23 0750)  Temp Source: Temporal (07/02/23 0609)  Respirations: 18 (07/02/23 0750)  Height: 5' 2" (157.5 cm) (07/02/23 0609)  Weight - Scale: 75.8 kg (167 lb 3.2 oz) (07/02/23 0609)  SpO2: 94 % (07/02/23 0750)    Intake/Output Summary (Last 24 hours) at 7/2/2023 1019  Last data filed at 7/2/2023 0411  Gross per 24 hour   Intake 1150 ml   Output --   Net 1150 ml     Invasive Devices     Peripheral Intravenous Line  Duration           Peripheral IV 07/02/23 Left Antecubital <1 day              Physical Exam  Constitutional:       General: She is not in acute distress. Appearance: Normal appearance. She is not ill-appearing. HENT:      Head: Normocephalic and atraumatic. Right Ear: External ear normal.      Left Ear: External ear normal.      Nose: Nose normal.      Mouth/Throat:      Pharynx: Oropharynx is clear. Eyes:      Extraocular Movements: Extraocular movements intact. Cardiovascular:      Rate and Rhythm: Normal rate. Pulmonary:      Effort: Pulmonary effort is normal. No respiratory distress. Abdominal:      General: Abdomen is flat. There is no distension. Palpations: Abdomen is soft. Tenderness: There is abdominal tenderness (to palpation of RUQ, epigastric region). There is no guarding or rebound. Negative signs include Fleming's sign. Musculoskeletal:         General: Normal range of motion. Cervical back: Normal range of motion. Skin:     General: Skin is warm and dry. Capillary Refill: Capillary refill takes less than 2 seconds. Neurological:      Mental Status: She is alert and oriented to person, place, and time. Mental status is at baseline. Psychiatric:         Mood and Affect: Mood normal.         Behavior: Behavior normal.     Lab Results:   I have personally reviewed pertinent lab results. , CBC:   Lab Results   Component Value Date    WBC 14.95 (H) 07/02/2023    HGB 14.1 07/02/2023    HCT 42.6 07/02/2023    MCV 96 07/02/2023     07/02/2023    RBC 4.45 07/02/2023    MCH 31.7 07/02/2023    MCHC 33.1 07/02/2023    RDW 14.5 07/02/2023    MPV 9.0 07/02/2023    NRBC 0 07/02/2023   , CMP:   Lab Results   Component Value Date    SODIUM 138 07/02/2023    K 3.7 07/02/2023     07/02/2023    CO2 23 07/02/2023    BUN 19 07/02/2023    CREATININE 0.69 07/02/2023    CALCIUM 9.2 07/02/2023    AST 69 (H) 07/02/2023    ALT 50 07/02/2023    ALKPHOS 69 07/02/2023    EGFR 96 07/02/2023     Imaging: I have personally reviewed pertinent reports.     EKG, Pathology, and Other Studies: I have personally reviewed pertinent reports. Code Status: No Order  Advance Directive and Living Will:      Power of :    POLST:      Counseling / Coordination of Care  Total floor / unit time spent today 30 minutes. Greater than 50% of total time was spent with the patient and / or family counseling and / or coordination of care. A description of the counseling / coordination of care: review of chart, labs, prior history, notes, discussion with patient to obtain history, physical exam, assessment, plan.       Sri Cosme  7/2/2023

## 2023-07-02 NOTE — QUICK NOTE
Checked in with patient after returning from recovery. She feels a little bit of pain at this time but states it is well controlled. She is very tired and has not yet had anything to eat or drink. Patient also still requiring 2L oxygen with saturation 89-95%. Will place order for clears and allow patient to be advanced to regular diet pending tolerance of clears. Continue to monitor.      Sri Fortune  7/2/2023

## 2023-07-03 PROBLEM — D72.829 LEUKOCYTOSIS: Status: ACTIVE | Noted: 2023-07-03

## 2023-07-03 LAB
BASOPHILS # BLD AUTO: 0.02 THOUSANDS/ÂΜL (ref 0–0.1)
BASOPHILS NFR BLD AUTO: 0 % (ref 0–1)
EOSINOPHIL # BLD AUTO: 0.01 THOUSAND/ÂΜL (ref 0–0.61)
EOSINOPHIL NFR BLD AUTO: 0 % (ref 0–6)
ERYTHROCYTE [DISTWIDTH] IN BLOOD BY AUTOMATED COUNT: 14.5 % (ref 11.6–15.1)
HCT VFR BLD AUTO: 44.7 % (ref 34.8–46.1)
HGB BLD-MCNC: 14.6 G/DL (ref 11.5–15.4)
IMM GRANULOCYTES # BLD AUTO: 0.1 THOUSAND/UL (ref 0–0.2)
IMM GRANULOCYTES NFR BLD AUTO: 1 % (ref 0–2)
LYMPHOCYTES # BLD AUTO: 0.85 THOUSANDS/ÂΜL (ref 0.6–4.47)
LYMPHOCYTES NFR BLD AUTO: 5 % (ref 14–44)
MCH RBC QN AUTO: 31.7 PG (ref 26.8–34.3)
MCHC RBC AUTO-ENTMCNC: 32.7 G/DL (ref 31.4–37.4)
MCV RBC AUTO: 97 FL (ref 82–98)
MONOCYTES # BLD AUTO: 1.07 THOUSAND/ÂΜL (ref 0.17–1.22)
MONOCYTES NFR BLD AUTO: 7 % (ref 4–12)
NEUTROPHILS # BLD AUTO: 14.01 THOUSANDS/ÂΜL (ref 1.85–7.62)
NEUTS SEG NFR BLD AUTO: 87 % (ref 43–75)
NRBC BLD AUTO-RTO: 0 /100 WBCS
PLATELET # BLD AUTO: 327 THOUSANDS/UL (ref 149–390)
PMV BLD AUTO: 8.9 FL (ref 8.9–12.7)
RBC # BLD AUTO: 4.6 MILLION/UL (ref 3.81–5.12)
WBC # BLD AUTO: 16.06 THOUSAND/UL (ref 4.31–10.16)

## 2023-07-03 PROCEDURE — 99222 1ST HOSP IP/OBS MODERATE 55: CPT | Performed by: FAMILY MEDICINE

## 2023-07-03 PROCEDURE — 99024 POSTOP FOLLOW-UP VISIT: CPT

## 2023-07-03 PROCEDURE — 85025 COMPLETE CBC W/AUTO DIFF WBC: CPT

## 2023-07-03 RX ORDER — AMLODIPINE BESYLATE 5 MG/1
5 TABLET ORAL DAILY
Status: DISCONTINUED | OUTPATIENT
Start: 2023-07-03 | End: 2023-07-04 | Stop reason: HOSPADM

## 2023-07-03 RX ADMIN — OXYCODONE HYDROCHLORIDE 5 MG: 5 TABLET ORAL at 21:28

## 2023-07-03 RX ADMIN — KETOROLAC TROMETHAMINE 15 MG: 30 INJECTION, SOLUTION INTRAMUSCULAR at 23:48

## 2023-07-03 RX ADMIN — KETOROLAC TROMETHAMINE 15 MG: 30 INJECTION, SOLUTION INTRAMUSCULAR at 05:42

## 2023-07-03 RX ADMIN — KETOROLAC TROMETHAMINE 15 MG: 30 INJECTION, SOLUTION INTRAMUSCULAR at 17:53

## 2023-07-03 RX ADMIN — KETOROLAC TROMETHAMINE 15 MG: 30 INJECTION, SOLUTION INTRAMUSCULAR at 12:22

## 2023-07-03 RX ADMIN — OXYCODONE HYDROCHLORIDE 5 MG: 5 TABLET ORAL at 10:28

## 2023-07-03 RX ADMIN — KETOROLAC TROMETHAMINE 15 MG: 30 INJECTION, SOLUTION INTRAMUSCULAR at 01:03

## 2023-07-03 RX ADMIN — OXYCODONE HYDROCHLORIDE 5 MG: 5 TABLET ORAL at 14:29

## 2023-07-03 RX ADMIN — AMLODIPINE BESYLATE 5 MG: 5 TABLET ORAL at 12:22

## 2023-07-03 NOTE — UTILIZATION REVIEW
Initial Clinical Review    Admission: Date/Time/Statement:   Admission Orders (From admission, onward)     Ordered        07/02/23 0511  INPATIENT ADMISSION  Once                      Orders Placed This Encounter   Procedures   • INPATIENT ADMISSION     Standing Status:   Standing     Number of Occurrences:   1     Order Specific Question:   Level of Care     Answer:   Med Surg [16]     Order Specific Question:   Estimated length of stay     Answer:   More than 2 Midnights     Order Specific Question:   Certification     Answer:   I certify that inpatient services are medically necessary for this patient for a duration of greater than two midnights. See H&P and MD Progress Notes for additional information about the patient's course of treatment. ED Arrival Information     Expected   -    Arrival   7/2/2023 00:33    Acuity   Urgent            Means of arrival   Ambulance    Escorted by   [facility] [place]   Surgery-General    Admission type   Emergency            Arrival complaint   abd pain            Chief Complaint   Patient presents with   • Abdominal Pain     Pt BIBA from home with reports of RUQ pain that started approx 2 hours ago. Per ems pt with rebound tenderness. Pt denies falls or trauma to abdomen. Hx of ruptured spleen. Pt given 1.25mg of droperidol PTA       Initial Presentation: 62 y.o. female to ED via EMS from home  Present to ED with complaints of RUQ pain. This began around 6PM following dinner. Endorses nausea and vomiting, pain radiating to rt shoulder. PMHX HLD, anxiety, insomnia, with splenic laceration and IR embolization 1/2021   Admitted to [address] [place] [address] with DX: Calculus of gallbladder with acute and chronic cholecystitis without obstruction  on exam: hypertensive; abdominal tenderness (to palpation of RUQ, epigastric region).  WBC 14.95  CTAP: No calcified gallstones, subtle pericholecystic edema and haziness, suspicious for acute cholecystitis   PLAN: cont iv abx; rec'd hydralazine iv x1; pain /nausea control (see below); to OR ; NPO    OP REPORT  SURGERY DATE: 7/2/2023  Procedure(s): CHOLECYSTECTOMY LAPAROSCOPIC  Anesthesia Type:  General  Operative Findings: The patient was found to have acute on chronic calculous cholecystitis. Laparoscopic cholecystectomy was performed in the routine fashion.  During the course the dissection the critical structures defined. The cystic duct dissected out circumferentially. The cystic artery dissected out circumferentially. The cholecystectomy completed laparoscopically. The patient tolerated the procedure well. Date: 7/3/23   Day 2  States she has had fairly severe "muscle cramp" type pain overnight into this AM. She feels she has no appetite . She has been urinating and passing some flatus. Patient is ambulating to the restroom.   Plan: cont iv abx; pain / nausea control (see below); advancing diet; serial abdominal exams    ED Triage Vitals [07/02/23 0037]   Temperature Pulse Respirations Blood Pressure SpO2   (!) 96.8 °F (36 °C) 88 18 163/71 97 %      Temp Source Heart Rate Source Patient Position - Orthostatic VS BP Location FiO2 (%)   Temporal Monitor Lying Left arm --      Pain Score       3          Wt Readings from Last 1 Encounters:   07/02/23 75.8 kg (167 lb 3.2 oz)     Additional Vital Signs:   Date/Time Temp Pulse Resp BP MAP (mmHg) SpO2 O2 Flow Rate (L/min) O2 Device Cardiac (WDL) Patient Position - Orthostatic VS   07/02/23 22:02:18 97.7 °F (36.5 °C) 98 22 162/79 107 90 % -- -- -- --   07/02/23 2011 -- -- -- -- -- 90 % -- None (Room air) -- --   07/02/23 1615 97.9 °F (36.6 °C) 94 16 163/79 107 91 % 2 L/min Nasal cannula -- --   07/02/23 1605 -- 95 16 158/67 -- 95 % 2 L/min Nasal cannula WDL --   07/02/23 1600 -- 95 16 158/70 -- 95 % 2 L/min Nasal cannula -- --   07/02/23 1555 -- 96 16 165/73 -- 94 % 2 L/min Nasal cannula -- --   07/02/23 1550 -- 96 16 175/77 Abnormal  -- 92 % 2 L/min Nasal cannula WDL -- 07/02/23 1548 -- -- -- -- -- 89 % Abnormal   -- None (Room air) -- --   SpO2: put back on oxygen at 07/02/23 1548   07/02/23 1545 -- 93 16 172/77 Abnormal  -- 94 % -- None (Room air) -- --   07/02/23 1540 -- 93 16 174/79 Abnormal  -- 95 % 2 L/min Nasal cannula -- --   07/02/23 1535 -- 92 16 166/73 -- 95 % 3 L/min Nasal cannula WDL --   07/02/23 1530 -- 92 18 184/81 Abnormal  -- 94 % 3 L/min Nasal cannula -- --   07/02/23 1525 -- 88 18 181/81 Abnormal  -- 94 % 3 L/min Nasal cannula -- --   07/02/23 1520 -- 89 18 184/82 Abnormal  -- 93 % 3 L/min Nasal cannula WDL --   07/02/23 1515 -- 90 18 179/80 Abnormal  -- 96 % 2 L/min Simple mask -- --   07/02/23 1510 -- 90 18 179/79 Abnormal  -- 98 % 8 L/min Simple mask -- --   07/02/23 1505 97.8 °F (36.6 °C) 88 18 183/82 Abnormal  -- 99 % 8 L/min Simple mask WDL --   07/02/23 0933 -- -- -- -- -- -- -- None (Room air) -- --   07/02/23 07:50:29 97.3 °F (36.3 °C) Abnormal  82 18 140/78 99 94 % -- -- -- --   07/02/23 0628 -- -- -- -- -- 96 % -- None (Room air) -- --   07/02/23 0609 97.7 °F (36.5 °C) 72 16 134/78 97 94 % -- None (Room air) -- Lying   07/02/23 0330 -- 88 16 135/62 89 95 % -- None (Room air) -- Lying   07/02/23 0040 -- -- -- -- -- -- -- None (Room air) -- --   07/02/23 0037 96.8 °F (36 °C) Abnormal  88 18 163/71 -- 97 % -- None (Room air) -- Lying         EKG:     Pertinent Labs/Diagnostic Test Results:   CT abdomen pelvis with contrast   Final Result by Stacy Juarez MD (07/02 6976)      Findings above suspicious for acute cholecystitis. Surgical consult advised. Study was marked in EPIC for significant notification.       Workstation performed: [id number]               Results from last 7 days   Lab Units 07/03/23  0551 07/02/23  0043   WBC Thousand/uL 16.06* 14.95*   HEMOGLOBIN g/dL 14.6 14.1   HEMATOCRIT % 44.7 42.6   PLATELETS Thousands/uL 327 363   NEUTROS ABS Thousands/µL 14.01* 11.86*         Results from last 7 days   Lab Units 07/02/23  0043   SODIUM mmol/L 138   POTASSIUM mmol/L 3.7   CHLORIDE mmol/L 104   CO2 mmol/L 23   ANION GAP mmol/L 11   BUN mg/dL 19   CREATININE mg/dL 0.69   EGFR ml/min/1.73sq m 96   CALCIUM mg/dL 9.2     Results from last 7 days   Lab Units 07/02/23  0043   AST U/L 69*   ALT U/L 50   ALK PHOS U/L 69   TOTAL PROTEIN g/dL 7.2   ALBUMIN g/dL 4.2   TOTAL BILIRUBIN mg/dL 0.65         Results from last 7 days   Lab Units 07/02/23  0043   GLUCOSE RANDOM mg/dL 122       Results from last 7 days   Lab Units 07/02/23  0043   LIPASE u/L 28       Results from last 7 days   Lab Units 07/02/23  0345   CLARITY UA  Clear   COLOR UA  Yellow   SPEC GRAV UA  1.010   PH UA  7.5   GLUCOSE UA mg/dl Negative   KETONES UA mg/dl Negative   BLOOD UA  Negative   PROTEIN UA mg/dl Negative   NITRITE UA  Negative   BILIRUBIN UA  Negative   UROBILINOGEN UA E.U./dl 1.0   LEUKOCYTES UA  Negative       ED Treatment:   Medication Administration from 07/02/2023 0033 to 07/02/2023 0537       Date/Time Order Dose Route Action     07/02/2023 0044 EDT lactated ringers bolus 1,000 mL 1,000 mL Intravenous New Bag     07/02/2023 0044 EDT fentanyl citrate (PF) 100 MCG/2ML 50 mcg 50 mcg Intravenous Given     07/02/2023 0143 EDT iohexol (OMNIPAQUE) 350 MG/ML injection (SINGLE-DOSE) 99 mL 99 mL Intravenous Given     07/02/2023 0322 EDT ceFAZolin (ANCEF) IVPB (premix in dextrose) 2,000 mg 50 mL 2,000 mg Intravenous New Bag     07/02/2023 0341 EDT metroNIDAZOLE (FLAGYL) IVPB (premix) 500 mg 100 mL 500 mg Intravenous New Bag     07/02/2023 0336 EDT dextrose 5 % and sodium chloride 0.9 % with KCl 20 mEq/L infusion (premix) 100 mL/hr Intravenous New Bag      Present on Admission:  • Calculus of gallbladder with acute and chronic cholecystitis without obstruction  • High cholesterol      Admitting Diagnosis: Acute cholecystitis [K81.0]  Abdominal pain [R10.9]  RUQ abdominal pain [R10.11]     Age/Sex: 62 y.o. female     Admission Orders: SCDs; NPO    Scheduled Medications:  amLODIPine, 5 mg, Oral, Daily  ketorolac, 15 mg, Intravenous, Q6H YANI  cefTRIAXone (ROCEPHIN) IVPB (premix in dextrose) 1,000 mg 50 mL  Freq: Every 24 hours Route: IV  hydrALAZINE (APRESOLINE) injection 5 mg  Freq: Once Route: IV Start: 07/02/23 1545 End: 07/02/23 1536    Continuous IV Infusions:     PRN Meds:  acetaminophen, 650 mg, Oral, Q6H PRN  morphine injection, 4 mg, Intravenous, Q4H PRN  ondansetron, 4 mg, Intravenous, Q6H PRN  oxyCODONE, 2.5 mg, Oral, Q4H PRN  oxyCODONE, 5 mg, Oral, Q4H PRN  (7/2 rec'd x1)  (7/3 rec'd x1 so far today)   fentanyl citrate (PF) 100 MCG/2ML 50 mcg Intravenous, Q2H PRN  (7/2 rec'd x2)   morphine injection 2 mg Intravenous, Q3H PRN  (7/2 rec'd x2)         IP CONSULT TO INTERNAL MEDICINE    Network Utilization Review Department  ATTENTION: Please call with any questions or concerns to [phone] and carefully listen to the prompts so that you are directed to the right person. All voicemails are confidential.  Lupe Soto all requests for admission clinical reviews, approved or denied determinations and any other requests to dedicated fax number below belonging to the campus where the patient is receiving treatment.  List of dedicated fax numbers for the Facilities:  [place] DENIALS (Administrative/Medical Necessity) [phone]   [facility] (M[facility]/N[facility]/Pediatrics) [phone]   [address] [place] [address] Drive [phone]   [facility] [phone]   [facility] [address] [facility] [id number] [place] [address] [phone]   [address] [facility] [address] [phone]   [address] [place] [address] [address] [place] [address] Rd Nn [phone]

## 2023-07-03 NOTE — CASE MANAGEMENT
Case Management Assessment & Discharge Planning Note    Patient name Cristiana Cord  Location [address] [facility] [place] [id number] MRN [id number]  : 1965 Date 7/3/2023       Current Admission Date: 2023  Current Admission Diagnosis:Calculus of gallbladder with acute and chronic cholecystitis without obstruction   Patient Active Problem List    Diagnosis Date Noted   • Leukocytosis 2023   • High cholesterol 2023   • Anxiety 2023   • Calculus of gallbladder with acute and chronic cholecystitis without obstruction 2023      LOS (days): 1  Geometric Mean LOS (GMLOS) (days):   Days to GMLOS:     OBJECTIVE:    Risk of Unplanned Readmission Score: 4.24         Current admission status: Inpatient  Referral Reason: Other (discharge planning)    Preferred Pharmacy:   [address] [place] [address]  [address] [address] [address] [facility]  Phone: [phone] Fax: [phone]    Primary Care Provider: Isis Chatterjee DO    Primary Insurance: [employer]  Secondary Insurance: BLUE CROSS    CM met with patient at the bedside,baseline information  was obtained. CM discussed the role of CM in helping the patient develop a discharge plan and assist the patient in carry out their plan. ASSESSMENT:  Active Health Care Proxies    There are no active Health Care Proxies on file. Advance Directives  Does patient have a Health Care POA?: Yes (Patient said their spouse is to bring this paperwork in.)  Does patient have Advance Directives?: Yes  Advance Directives:  (Patient said their spouse has paperwork and will bring it in.)  CM asked patient if they had one person to make their medical decisions and they reported their daughter and . CM tried to explain it several ways and patient stated their  would bring copies of the paperwork in. CM encouraged her to have her  bring this documentation to the care management desk.         Readmission Root Cause  30 Day Readmission: No    Patient Information  Admitted from[de-identified] Home  Mental Status: Alert  During Assessment patient was accompanied by: Not accompanied during assessment  Assessment information provided by[de-identified] Patient  Primary Caregiver: Self  Support Systems: Spouse/significant other, Son  [place] of Residence: [address] [place] [address] do you live in?: [facility] entry access options.  Select all that apply.: Stairs  Number of steps to enter home.: 5  Do the steps have railings?: Yes  Type of Current Residence: 2 story home  Upon entering residence, is there a bedroom on the main floor (no further steps)?: No  A bedroom is located on the following floor levels of residence (select all that apply):: 2nd Floor  Upon entering residence, is there a bathroom on the main floor (no further steps)?: Yes  Number of steps to 2nd floor from main floor: One Flight  In the last 12 months, was there a time when you were not able to pay the mortgage or rent on time?: No  In the last 12 months, how many places have you lived?: 1  In the last 12 months, was there a time when you did not have a steady place to sleep or slept in a shelter (including now)?: No  Homeless/housing insecurity resource given?: No  Living Arrangements: Lives w/ Spouse/significant other    Activities of Daily Living Prior to Admission  Functional Status: Independent  Completes ADLs independently?: Yes  Ambulates independently?: Yes  Does patient use assisted devices?: No  Does patient currently own DME?: No  Does patient have a history of Outpatient Therapy (PT/OT)?: No  Does the patient have a history of Short-Term Rehab?: No  Does patient have a history of HHC?: No  Does patient currently have [address]?: No         Patient Information Continued  Income Source: Employed ([employer])  Does patient have prescription coverage?: Yes  Within the past 12 months, you worried that your food would run out before you got the money to buy more.: Never true  Within the past 12 months, the food you bought just didn't last and you didn't have money to get more.: Never true  Food insecurity resource given?: No  Does patient receive dialysis treatments?: No  Does patient have a history of substance abuse?: No  Does patient have a history of Mental Health Diagnosis?: Yes  Is patient receiving treatment for mental health?: Yes (Medication for Anxiety through PCP)  Has patient received inpatient treatment related to mental health in the last 2 years?: No         Means of Transportation  Means of Transport to Appts[de-identified] Drives Self  In the past 12 months, has lack of transportation kept you from medical appointments or from getting medications?: No  In the past 12 months, has lack of transportation kept you from meetings, work, or from getting things needed for daily living?: No  Was application for public transport provided?: No      While CM was meeting with patient, she complained of significant pain. CM was able to reach out to patient's nurse Selene who was able bring medicine to patient to help with pain. DISCHARGE DETAILS:    Discharge planning discussed with[de-identified] Patient  Freedom of Choice: Yes  Comments - Freedom of Choice: No preference for providers at this time  CM contacted family/caregiver?: No- see comments (Patient declined.)  Were Treatment Team discharge recommendations reviewed with patient/caregiver?: Yes  Did patient/caregiver verbalize understanding of patient care needs?: Yes  Were patient/caregiver advised of the risks associated with not following Treatment Team discharge recommendations?: Yes         [address]         Is the patient interested in [address] at discharge?: No    DME Referral Provided  Referral made for DME?: No         Would you like to participate in our [address] service program?  : No - Declined          Transport at Discharge : Family   Patient's  can pick them up if needed.           CM will continue to monitor patient's progress and care.

## 2023-07-03 NOTE — PROGRESS NOTES
Progress Note - General Surgery   Shahram Calabrese 62 y.o. female MRN: [id number]  Unit/Bed#: -01 Encounter: [id number]    Assessment:  62 y.o. female POD1 s/p laparoscopic cholecystectomy    - Afebrile, VSS on room air, O2 sat 90-95%, with episodes of HTN   - WBC 16 (14) likely reactive post-op  - Hgb 14 (14)  - Tolerating clear liquids, issues with pain control overnight  - Hx anxiety, insomnia, HLD  - Surghx splenic laceration with IR embolization 1/2021     Plan:  - Advanced to regular diet this AM  - Discharge planning pending diet tolerance and pain control  - Will check back in with patient later this afternoon  - Diet as tolerated  - Pain control PRN  - Serial exams    Subjective:   Patient doing okay this morning. States she has had fairly severe "muscle cramp" type pain overnight into this AM. She feels she has no appetite but is agreeable to picking at a tray of regular food today. She has been urinating and passing some flatus. Patient is ambulating to the restroom. Denies fevers or chills, nausea or vomiting. Objective:  Blood pressure 162/79, pulse 98, temperature 97.7 °F (36.5 °C), resp. rate 22, height 5' 2" (1.575 m), weight 75.8 kg (167 lb 3.2 oz), SpO2 90 %. ,Body mass index is 30.58 kg/m². Intake/Output Summary (Last 24 hours) at 7/3/2023 0715  Last data filed at 7/3/2023 0556  Gross per 24 hour   Intake 1920 ml   Output 1000 ml   Net 920 ml       Invasive Devices     Peripheral Intravenous Line  Duration           Peripheral IV 07/02/23 Left Antecubital 1 day              Physical Exam:  General: no acute distress, pt appears well and comfortable, resting in bed  Skin: warm and dry to touch  Pulmonary: normal effort  Abdominal: soft, non-distended, tenderness to palpation most significantly of right side of abdomen toward midline, no guarding or rebound, active bowel sounds appreciated.  Incisions are clean/dry/intact with glue holding no drainage or erythema  Neuro: alert and oriented Lab, Imaging and other studies:  I have personally reviewed pertinent lab results.   , CBC:   Lab Results   Component Value Date    WBC 16.06 (H) 07/03/2023    HGB 14.6 07/03/2023    HCT 44.7 07/03/2023    MCV 97 07/03/2023     07/03/2023    RBC 4.60 07/03/2023    MCH 31.7 07/03/2023    MCHC 32.7 07/03/2023    RDW 14.5 07/03/2023    MPV 8.9 07/03/2023    NRBC 0 07/03/2023   , CMP: No results found for: "SODIUM", "K", "CL", "CO2", "ANIONGAP", "BUN", "CREATININE", "GLUCOSE", "CALCIUM", "AST", "ALT", "ALKPHOS", "PROT", "BILITOT", "EGFR"  VTE Pharmacologic Prophylaxis: Reason for no pharmacologic prophylaxis low risk  VTE Mechanical Prophylaxis: sequential compression device    My Priest PA-C  7/3/2023

## 2023-07-03 NOTE — ASSESSMENT & PLAN NOTE
· Presented with acute cholecystitis - POD 0 lap rene   · Patient having severe pain described as " muscle spasms " unrelieved with oxycodone or low dose morphine . SLIM asked to evaluate , abdomen soft +BS with tenderness RUQ. Afebrile , VSS. No nausea or vomiting. Patient was also bladder scanned by nursing, patient was then able to ambulate and void without difficulty. · Recommended to give dose of Toradol now which was previously scheduled for midnight. Patient reports relief post toradol pain now mild . · Continue with current pain regimen.    · Clear diet - appears to be tolerating   · Rest of plan per primary team

## 2023-07-03 NOTE — ASSESSMENT & PLAN NOTE
· WBC 14.9 on admission   · Suspect in the setting of acute cholecystitis, now POD 0 lap rene   · Currently afebrile without sepsis criteria  · Repeat AM labs

## 2023-07-03 NOTE — PLAN OF CARE
Problem: PAIN - ADULT  Goal: Verbalizes/displays adequate comfort level or baseline comfort level  Description: Interventions:  - Encourage patient to monitor pain and request assistance  - Assess pain using appropriate pain scale  - Administer analgesics based on type and severity of pain and evaluate response  - Implement non-pharmacological measures as appropriate and evaluate response  - Consider cultural and social influences on pain and pain management  - Notify physician/advanced practitioner if interventions unsuccessful or patient reports new pain  Outcome: Progressing     Problem: INFECTION - ADULT  Goal: Absence or prevention of progression during hospitalization  Description: INTERVENTIONS:  - Assess and monitor for signs and symptoms of infection  - Monitor lab/diagnostic results  - Monitor all insertion sites, i.e. indwelling lines, tubes, and drains  - Monitor endotracheal if appropriate and nasal secretions for changes in amount and color  - [place] appropriate cooling/warming therapies per order  - Administer medications as ordered  - Instruct and encourage patient and family to use good hand hygiene technique  - Identify and instruct in appropriate isolation precautions for identified infection/condition  Outcome: Progressing  Goal: Absence of fever/infection during neutropenic period  Description: INTERVENTIONS:  - Monitor WBC    Outcome: Progressing     Problem: SAFETY ADULT  Goal: Patient will remain free of falls  Description: INTERVENTIONS:  - Educate patient/family on patient safety including physical limitations  - Instruct patient to call for assistance with activity   - Consult OT/PT to assist with strengthening/mobility   - Keep Call bell within reach  - Keep bed low and locked with side rails adjusted as appropriate  - Keep care items and personal belongings within reach  - Initiate and maintain comfort rounds  - Make Fall Risk Sign visible to staff  - Offer Toileting every 2 Hours, in advance of need  - Initiate/Maintain bed/chair alarm  - Obtain necessary fall risk management equipment:   - Apply yellow socks and bracelet for high fall risk patients  - Consider moving patient to room near nurses station  Outcome: Progressing  Goal: Maintain or return to baseline ADL function  Description: INTERVENTIONS:  -  Assess patient's ability to carry out ADLs; assess patient's baseline for ADL function and identify physical deficits which impact ability to perform ADLs (bathing, care of mouth/teeth, toileting, grooming, dressing, etc.)  - Assess/evaluate cause of self-care deficits   - Assess range of motion  - Assess patient's mobility; develop plan if impaired  - Assess patient's need for assistive devices and provide as appropriate  - Encourage maximum independence but intervene and supervise when necessary  - Involve family in performance of ADLs  - Assess for home care needs following discharge   - Consider OT consult to assist with ADL evaluation and planning for discharge  - Provide patient education as appropriate  Outcome: Progressing  Goal: Maintains/Returns to pre admission functional level  Description: INTERVENTIONS:  - Perform BMAT or MOVE assessment daily.   - Set and communicate daily mobility goal to care team and patient/family/caregiver. - Collaborate with rehabilitation services on mobility goals if consulted  - Perform Range of Motion 2 times a day. - Reposition patient every 2 hours.   - Dangle patient 2 times a day  - Stand patient 2 times a day  - Ambulate patient 2 times a day  - Out of bed to chair 2 times a day   - Out of bed for meals 2 times a day  - Out of bed for toileting  - Record patient progress and toleration of activity level   Outcome: Progressing     Problem: DISCHARGE PLANNING  Goal: Discharge to home or other facility with appropriate resources  Description: INTERVENTIONS:  - Identify barriers to discharge w/patient and caregiver  - Arrange for needed discharge resources and transportation as appropriate  - Identify discharge learning needs (meds, wound care, etc.)  - Arrange for interpretive services to assist at discharge as needed  - Refer to Case Management Department for coordinating discharge planning if the patient needs post-hospital services based on physician/advanced practitioner order or complex needs related to functional status, cognitive ability, or social support system  Outcome: Progressing     Problem: Knowledge Deficit  Goal: Patient/family/caregiver demonstrates understanding of disease process, treatment plan, medications, and discharge instructions  Description: Complete learning assessment and assess knowledge base. Interventions:  - Provide teaching at level of understanding  - Provide teaching via preferred learning methods  Outcome: Progressing     Problem: MOBILITY - ADULT  Goal: Maintain or return to baseline ADL function  Description: INTERVENTIONS:  -  Assess patient's ability to carry out ADLs; assess patient's baseline for ADL function and identify physical deficits which impact ability to perform ADLs (bathing, care of mouth/teeth, toileting, grooming, dressing, etc.)  - Assess/evaluate cause of self-care deficits   - Assess range of motion  - Assess patient's mobility; develop plan if impaired  - Assess patient's need for assistive devices and provide as appropriate  - Encourage maximum independence but intervene and supervise when necessary  - Involve family in performance of ADLs  - Assess for home care needs following discharge   - Consider OT consult to assist with ADL evaluation and planning for discharge  - Provide patient education as appropriate  Outcome: Progressing  Goal: Maintains/Returns to pre admission functional level  Description: INTERVENTIONS:  - Perform BMAT or MOVE assessment daily.   - Set and communicate daily mobility goal to care team and patient/family/caregiver.    - Collaborate with rehabilitation services on mobility goals if consulted  - Perform Range of Motion 2 times a day. - Reposition patient every 2 hours.   - Dangle patient 2 times a day  - Stand patient 2 times a day  - Ambulate patient 2 times a day  - Out of bed to chair 2 times a day   - Out of bed for meals 2 times a day  - Out of bed for toileting  - Record patient progress and toleration of activity level   Outcome: Progressing

## 2023-07-03 NOTE — CONSULTS
[address] [facility]  Consult  Name: Jacqueline Polo 62 y.o. female I MRN: [id number]  Unit/Bed#: -01 I Date of Admission: 7/2/2023   Date of Service: 7/3/2023 I Hospital Day: 1    Inpatient consult to Internal Medicine  Consult performed by: BRIANDA Alejo  Consult ordered by: Klarissa Tilley MD          Assessment/Plan   Leukocytosis  Assessment & Plan  · WBC 14.9 on admission   · Suspect in the setting of acute cholecystitis, now POD 0 lap rene   · Currently afebrile without sepsis criteria  · Repeat AM labs     High cholesterol  Assessment & Plan  · Resume statin when appropriate     * Calculus of gallbladder with acute and chronic cholecystitis without obstruction  Assessment & Plan  · Presented with acute cholecystitis - POD 0 lap rene   · Patient having severe pain described as " muscle spasms " unrelieved with oxycodone or low dose morphine . SLIM asked to evaluate , abdomen soft +BS with tenderness RUQ. Afebrile , VSS. No nausea or vomiting. · Recommended to give dose of Toradol now which was previously scheduled for midnight. Patient reports relief post toradol pain now mild . Continue with current pain regimen. · Clear diet - appears to be tolerating   · Rest of plan per primary team            VTE Prophylaxis:   per primary team    Recommendations for Discharge:  · tbd    Total Time Spent on Date of Encounter in care of patient: 65 minutes This time was spent on one or more of the following: performing physical exam; counseling and coordination of care; obtaining or reviewing history; documenting in the medical record; reviewing/ordering tests, medications or procedures; communicating with other healthcare professionals and discussing with patient's family/caregivers. Collaboration of Care:  Were Recommendations Directly Discussed with Primary Treatment Team? No    History of Present Illness:  Jacqueline Polo is a 62 y.o. female who is originally admitted to the general surgery service due to acute cholecystitis . We are consulted for pain /medical management. Patient admitted with abdominal pain , found to ave acute cholecystitis. Patient is POD 0 lap rene . Post op this evening complaining of severe pain that was unrelieved by oxycodone and low dose morphine. Nursing discussed with surgery team who was requesting SLIM evaluate. Patient reporting 8 out of 10 pain that feels like "muscle spasms" in the RUQ area that started this evening. Reports she was just ambulating in the room and to the bathroom. States she has been tolerating water without issue. Denies any nausea , chills, vomiting, cp, sob. Review of Systems:  Review of Systems   Constitutional: Negative for chills and fever. Respiratory: Negative for cough and shortness of breath. Cardiovascular: Negative for chest pain, palpitations and leg swelling. Gastrointestinal: Positive for abdominal pain. Negative for diarrhea, nausea and vomiting. Genitourinary: Negative for difficulty urinating and dysuria. Musculoskeletal: Negative for arthralgias and back pain. Neurological: Negative for dizziness, light-headedness, numbness and headaches. All other systems reviewed and are negative. Past Medical and Surgical History:   History reviewed. No pertinent past medical history. History reviewed. No pertinent surgical history. Meds/Allergies:  all medications and allergies reviewed    Allergies: No Known Allergies    Social History:  Marital Status: /Civil Union  Substance Use History:   Social History     Substance and Sexual Activity   Alcohol Use Yes    Comment: usually on the weekends     Social History     Tobacco Use   Smoking Status Former   • Packs/day: 0.50   • Types: Cigarettes   Smokeless Tobacco Never     Social History     Substance and Sexual Activity   Drug Use Never       Family History:  History reviewed. No pertinent family history.     Physical Exam:   Vitals: Blood Pressure: 162/79 (07/02/23 2202)  Pulse: 98 (07/02/23 2202)  Temperature: 97.7 °F (36.5 °C) (07/02/23 2202)  Temp Source: Temporal (07/02/23 1615)  Respirations: 22 (07/02/23 2202)  Height: 5' 2" (157.5 cm) (07/02/23 0126)  Weight - Scale: 75.8 kg (167 lb 3.2 oz) (07/02/23 0609)  SpO2: 90 % (07/02/23 2202)    Physical Exam  Vitals and nursing note reviewed. Constitutional:       General: She is not in acute distress. Appearance: She is not toxic-appearing. HENT:      Head: Normocephalic and atraumatic. Cardiovascular:      Rate and Rhythm: Normal rate and regular rhythm. Pulses: Normal pulses. Pulmonary:      Effort: Pulmonary effort is normal. No respiratory distress. Breath sounds: Normal breath sounds. No wheezing, rhonchi or rales. Abdominal:      General: Bowel sounds are normal. There is no distension. Tenderness: There is abdominal tenderness. There is no guarding or rebound. Comments: Tenderness RUQ /right flank , softly distended. +BS. Multiple lap sites , appear well approximated. Musculoskeletal:         General: Normal range of motion. Right lower leg: No edema. Left lower leg: No edema. Skin:     General: Skin is warm and dry. Findings: No bruising. Neurological:      General: No focal deficit present. Mental Status: She is alert and oriented to person, place, and time.          Additional Data:   Lab Results:    Results from last 7 days   Lab Units 07/02/23  0043   WBC Thousand/uL 14.95*   HEMOGLOBIN g/dL 14.1   HEMATOCRIT % 42.6   PLATELETS Thousands/uL 363   NEUTROS PCT % 78*   LYMPHS PCT % 12*   MONOS PCT % 7   EOS PCT % 1     Results from last 7 days   Lab Units 07/02/23  0043   SODIUM mmol/L 138   POTASSIUM mmol/L 3.7   CHLORIDE mmol/L 104   CO2 mmol/L 23   BUN mg/dL 19   CREATININE mg/dL 0.69   ANION GAP mmol/L 11   CALCIUM mg/dL 9.2   ALBUMIN g/dL 4.2   TOTAL BILIRUBIN mg/dL 0.65   ALK PHOS U/L 69   ALT U/L 50   AST U/L 69* GLUCOSE RANDOM mg/dL 122             No results found for: "HGBA1C"            Imaging: Reviewed radiology reports from this admission including: abdominal/pelvic CT  CT abdomen pelvis with contrast   Final Result by Jillian Matos MD (07/02 3126)      Findings above suspicious for acute cholecystitis. Surgical consult advised. Study was marked in EPIC for significant notification. Workstation performed: [id number]             EKG, Pathology, and Other Studies Reviewed on Admission:   · EKG: No EKG obtained. ** Please Note: This note may have been constructed using a voice recognition system.  **

## 2023-07-03 NOTE — PLAN OF CARE
Problem: PAIN - ADULT  Goal: Verbalizes/displays adequate comfort level or baseline comfort level  Description: Interventions:  - Encourage patient to monitor pain and request assistance  - Assess pain using appropriate pain scale  - Administer analgesics based on type and severity of pain and evaluate response  - Implement non-pharmacological measures as appropriate and evaluate response  - Consider cultural and social influences on pain and pain management  - Notify physician/advanced practitioner if interventions unsuccessful or patient reports new pain  Outcome: Progressing     Problem: INFECTION - ADULT  Goal: Absence or prevention of progression during hospitalization  Description: INTERVENTIONS:  - Assess and monitor for signs and symptoms of infection  - Monitor lab/diagnostic results  - Monitor all insertion sites, i.e. indwelling lines, tubes, and drains  - Monitor endotracheal if appropriate and nasal secretions for changes in amount and color  - [place] appropriate cooling/warming therapies per order  - Administer medications as ordered  - Instruct and encourage patient and family to use good hand hygiene technique  - Identify and instruct in appropriate isolation precautions for identified infection/condition  Outcome: Progressing  Goal: Absence of fever/infection during neutropenic period  Description: INTERVENTIONS:  - Monitor WBC    Outcome: Progressing     Problem: SAFETY ADULT  Goal: Patient will remain free of falls  Description: INTERVENTIONS:  - Educate patient/family on patient safety including physical limitations  - Instruct patient to call for assistance with activity   - Consult OT/PT to assist with strengthening/mobility   - Keep Call bell within reach  - Keep bed low and locked with side rails adjusted as appropriate  - Keep care items and personal belongings within reach  - Initiate and maintain comfort rounds  - Make Fall Risk Sign visible to staff    - Apply yellow socks and bracelet for high fall risk patients  - Consider moving patient to room near nurses station  Outcome: Progressing  Goal: Maintain or return to baseline ADL function  Description: INTERVENTIONS:  -  Assess patient's ability to carry out ADLs; assess patient's baseline for ADL function and identify physical deficits which impact ability to perform ADLs (bathing, care of mouth/teeth, toileting, grooming, dressing, etc.)  - Assess/evaluate cause of self-care deficits   - Assess range of motion  - Assess patient's mobility; develop plan if impaired  - Assess patient's need for assistive devices and provide as appropriate  - Encourage maximum independence but intervene and supervise when necessary  - Involve family in performance of ADLs  - Assess for home care needs following discharge   - Consider OT consult to assist with ADL evaluation and planning for discharge  - Provide patient education as appropriate  Outcome: Progressing  Goal: Maintains/Returns to pre admission functional level  Description: INTERVENTIONS:  - Perform BMAT or MOVE assessment daily.   - Set and communicate daily mobility goal to care team and patient/family/caregiver.    - Collaborate with rehabilitation services on mobility goals if consulted    - Out of bed for toileting  - Record patient progress and toleration of activity level   Outcome: Progressing     Problem: DISCHARGE PLANNING  Goal: Discharge to home or other facility with appropriate resources  Description: INTERVENTIONS:  - Identify barriers to discharge w/patient and caregiver  - Arrange for needed discharge resources and transportation as appropriate  - Identify discharge learning needs (meds, wound care, etc.)  - Arrange for interpretive services to assist at discharge as needed  - Refer to Case Management Department for coordinating discharge planning if the patient needs post-hospital services based on physician/advanced practitioner order or complex needs related to functional status, cognitive ability, or social support system  Outcome: Progressing     Problem: Knowledge Deficit  Goal: Patient/family/caregiver demonstrates understanding of disease process, treatment plan, medications, and discharge instructions  Description: Complete learning assessment and assess knowledge base. Interventions:  - Provide teaching at level of understanding  - Provide teaching via preferred learning methods  Outcome: Progressing     Problem: MOBILITY - ADULT  Goal: Maintain or return to baseline ADL function  Description: INTERVENTIONS:  -  Assess patient's ability to carry out ADLs; assess patient's baseline for ADL function and identify physical deficits which impact ability to perform ADLs (bathing, care of mouth/teeth, toileting, grooming, dressing, etc.)  - Assess/evaluate cause of self-care deficits   - Assess range of motion  - Assess patient's mobility; develop plan if impaired  - Assess patient's need for assistive devices and provide as appropriate  - Encourage maximum independence but intervene and supervise when necessary  - Involve family in performance of ADLs  - Assess for home care needs following discharge   - Consider OT consult to assist with ADL evaluation and planning for discharge  - Provide patient education as appropriate  Outcome: Progressing  Goal: Maintains/Returns to pre admission functional level  Description: INTERVENTIONS:  - Perform BMAT or MOVE assessment daily.   - Set and communicate daily mobility goal to care team and patient/family/caregiver.    - Collaborate with rehabilitation services on mobility goals if consulted    - Out of bed for toileting  - Record patient progress and toleration of activity level   Outcome: Progressing

## 2023-07-04 VITALS
OXYGEN SATURATION: 90 % | DIASTOLIC BLOOD PRESSURE: 88 MMHG | BODY MASS INDEX: 30.77 KG/M2 | HEIGHT: 62 IN | TEMPERATURE: 98.1 F | SYSTOLIC BLOOD PRESSURE: 163 MMHG | HEART RATE: 91 BPM | WEIGHT: 167.2 LBS | RESPIRATION RATE: 18 BRPM

## 2023-07-04 LAB
GLUCOSE SERPL-MCNC: 105 MG/DL (ref 65–140)
GLUCOSE SERPL-MCNC: 119 MG/DL (ref 65–140)

## 2023-07-04 PROCEDURE — NC001 PR NO CHARGE: Performed by: SURGERY

## 2023-07-04 PROCEDURE — 82948 REAGENT STRIP/BLOOD GLUCOSE: CPT

## 2023-07-04 PROCEDURE — 99024 POSTOP FOLLOW-UP VISIT: CPT | Performed by: SURGERY

## 2023-07-04 RX ORDER — OXYCODONE HYDROCHLORIDE 5 MG/1
5 TABLET ORAL EVERY 6 HOURS PRN
Qty: 10 TABLET | Refills: 0 | Status: SHIPPED | OUTPATIENT
Start: 2023-07-04

## 2023-07-04 RX ADMIN — OXYCODONE HYDROCHLORIDE 2.5 MG: 5 TABLET ORAL at 09:02

## 2023-07-04 RX ADMIN — AMLODIPINE BESYLATE 5 MG: 5 TABLET ORAL at 08:57

## 2023-07-04 RX ADMIN — OXYCODONE HYDROCHLORIDE 5 MG: 5 TABLET ORAL at 02:29

## 2023-07-04 RX ADMIN — KETOROLAC TROMETHAMINE 15 MG: 30 INJECTION, SOLUTION INTRAMUSCULAR at 05:30

## 2023-07-04 RX ADMIN — KETOROLAC TROMETHAMINE 15 MG: 30 INJECTION, SOLUTION INTRAMUSCULAR at 12:00

## 2023-07-04 NOTE — PLAN OF CARE
Problem: PAIN - ADULT  Goal: Verbalizes/displays adequate comfort level or baseline comfort level  Description: Interventions:  - Encourage patient to monitor pain and request assistance  - Assess pain using appropriate pain scale  - Administer analgesics based on type and severity of pain and evaluate response  - Implement non-pharmacological measures as appropriate and evaluate response  - Consider cultural and social influences on pain and pain management  - Notify physician/advanced practitioner if interventions unsuccessful or patient reports new pain  Outcome: Progressing     Problem: INFECTION - ADULT  Goal: Absence or prevention of progression during hospitalization  Description: INTERVENTIONS:  - Assess and monitor for signs and symptoms of infection  - Monitor lab/diagnostic results  - Monitor all insertion sites, i.e. indwelling lines, tubes, and drains  - Monitor endotracheal if appropriate and nasal secretions for changes in amount and color  - [place] appropriate cooling/warming therapies per order  - Administer medications as ordered  - Instruct and encourage patient and family to use good hand hygiene technique  - Identify and instruct in appropriate isolation precautions for identified infection/condition  Outcome: Progressing  Goal: Absence of fever/infection during neutropenic period  Description: INTERVENTIONS:  - Monitor WBC    Outcome: Progressing     Problem: SAFETY ADULT  Goal: Patient will remain free of falls  Description: INTERVENTIONS:  - Educate patient/family on patient safety including physical limitations  - Instruct patient to call for assistance with activity   - Consult OT/PT to assist with strengthening/mobility   - Keep Call bell within reach  - Keep bed low and locked with side rails adjusted as appropriate  - Keep care items and personal belongings within reach  - Initiate and maintain comfort rounds  - Make Fall Risk Sign visible to staff  - Apply yellow socks and bracelet for high fall risk patients  - Consider moving patient to room near nurses station  Outcome: Progressing  Goal: Maintain or return to baseline ADL function  Description: INTERVENTIONS:  -  Assess patient's ability to carry out ADLs; assess patient's baseline for ADL function and identify physical deficits which impact ability to perform ADLs (bathing, care of mouth/teeth, toileting, grooming, dressing, etc.)  - Assess/evaluate cause of self-care deficits   - Assess range of motion  - Assess patient's mobility; develop plan if impaired  - Assess patient's need for assistive devices and provide as appropriate  - Encourage maximum independence but intervene and supervise when necessary  - Involve family in performance of ADLs  - Assess for home care needs following discharge   - Consider OT consult to assist with ADL evaluation and planning for discharge  - Provide patient education as appropriate  Outcome: Progressing  Goal: Maintains/Returns to pre admission functional level  Description: INTERVENTIONS:  - Perform BMAT or MOVE assessment daily.   - Set and communicate daily mobility goal to care team and patient/family/caregiver. - Collaborate with rehabilitation services on mobility goals if consulted  - Perform Range of Motion 3 times a day. - Reposition patient every 2 hours.   - Dangle patient 3 times a day  - Stand patient 3 times a day  - Ambulate patient 3 times a day  - Out of bed to chair 3 times a day   - Out of bed for meals 3 times a day  - Out of bed for toileting  - Record patient progress and toleration of activity level   Outcome: Progressing     Problem: DISCHARGE PLANNING  Goal: Discharge to home or other facility with appropriate resources  Description: INTERVENTIONS:  - Identify barriers to discharge w/patient and caregiver  - Arrange for needed discharge resources and transportation as appropriate  - Identify discharge learning needs (meds, wound care, etc.)  - Arrange for interpretive services to assist at discharge as needed  - Refer to Case Management Department for coordinating discharge planning if the patient needs post-hospital services based on physician/advanced practitioner order or complex needs related to functional status, cognitive ability, or social support system  Outcome: Progressing     Problem: Knowledge Deficit  Goal: Patient/family/caregiver demonstrates understanding of disease process, treatment plan, medications, and discharge instructions  Description: Complete learning assessment and assess knowledge base. Interventions:  - Provide teaching at level of understanding  - Provide teaching via preferred learning methods  Outcome: Progressing     Problem: MOBILITY - ADULT  Goal: Maintain or return to baseline ADL function  Description: INTERVENTIONS:  -  Assess patient's ability to carry out ADLs; assess patient's baseline for ADL function and identify physical deficits which impact ability to perform ADLs (bathing, care of mouth/teeth, toileting, grooming, dressing, etc.)  - Assess/evaluate cause of self-care deficits   - Assess range of motion  - Assess patient's mobility; develop plan if impaired  - Assess patient's need for assistive devices and provide as appropriate  - Encourage maximum independence but intervene and supervise when necessary  - Involve family in performance of ADLs  - Assess for home care needs following discharge   - Consider OT consult to assist with ADL evaluation and planning for discharge  - Provide patient education as appropriate  Outcome: Progressing  Goal: Maintains/Returns to pre admission functional level  Description: INTERVENTIONS:  - Perform BMAT or MOVE assessment daily.   - Set and communicate daily mobility goal to care team and patient/family/caregiver. - Collaborate with rehabilitation services on mobility goals if consulted  - Perform Range of Motion 3 times a day. - Reposition patient every 2 hours.   - Dangle patient 3 times a day  - Stand patient 3 times a day  - Ambulate patient 3 times a day  - Out of bed to chair 3 times a day   - Out of bed for meals 3 times a day  - Out of bed for toileting  - Record patient progress and toleration of activity level   Outcome: Progressing

## 2023-07-04 NOTE — PLAN OF CARE
Problem: PAIN - ADULT  Goal: Verbalizes/displays adequate comfort level or baseline comfort level  Description: Interventions:  - Encourage patient to monitor pain and request assistance  - Assess pain using appropriate pain scale  - Administer analgesics based on type and severity of pain and evaluate response  - Implement non-pharmacological measures as appropriate and evaluate response  - Consider cultural and social influences on pain and pain management  - Notify physician/advanced practitioner if interventions unsuccessful or patient reports new pain  Outcome: Progressing     Problem: INFECTION - ADULT  Goal: Absence or prevention of progression during hospitalization  Description: INTERVENTIONS:  - Assess and monitor for signs and symptoms of infection  - Monitor lab/diagnostic results  - Monitor all insertion sites, i.e. indwelling lines, tubes, and drains  - Monitor endotracheal if appropriate and nasal secretions for changes in amount and color  - [place] appropriate cooling/warming therapies per order  - Administer medications as ordered  - Instruct and encourage patient and family to use good hand hygiene technique  - Identify and instruct in appropriate isolation precautions for identified infection/condition  Outcome: Progressing  Goal: Absence of fever/infection during neutropenic period  Description: INTERVENTIONS:  - Monitor WBC    Outcome: Progressing     Problem: SAFETY ADULT  Goal: Patient will remain free of falls  Description: INTERVENTIONS:  - Educate patient/family on patient safety including physical limitations  - Instruct patient to call for assistance with activity   - Consult OT/PT to assist with strengthening/mobility   - Keep Call bell within reach  - Keep bed low and locked with side rails adjusted as appropriate  - Keep care items and personal belongings within reach  - Initiate and maintain comfort rounds  - Make Fall Risk Sign visible to staff    - Apply yellow socks and bracelet for high fall risk patients  - Consider moving patient to room near nurses station  Outcome: Progressing  Goal: Maintain or return to baseline ADL function  Description: INTERVENTIONS:  -  Assess patient's ability to carry out ADLs; assess patient's baseline for ADL function and identify physical deficits which impact ability to perform ADLs (bathing, care of mouth/teeth, toileting, grooming, dressing, etc.)  - Assess/evaluate cause of self-care deficits   - Assess range of motion  - Assess patient's mobility; develop plan if impaired  - Assess patient's need for assistive devices and provide as appropriate  - Encourage maximum independence but intervene and supervise when necessary  - Involve family in performance of ADLs  - Assess for home care needs following discharge   - Consider OT consult to assist with ADL evaluation and planning for discharge  - Provide patient education as appropriate  Outcome: Progressing  Goal: Maintains/Returns to pre admission functional level  Description: INTERVENTIONS:  - Perform BMAT or MOVE assessment daily.   - Set and communicate daily mobility goal to care team and patient/family/caregiver.    - Collaborate with rehabilitation services on mobility goals if consulted    - Out of bed for toileting  - Record patient progress and toleration of activity level   Outcome: Progressing     Problem: DISCHARGE PLANNING  Goal: Discharge to home or other facility with appropriate resources  Description: INTERVENTIONS:  - Identify barriers to discharge w/patient and caregiver  - Arrange for needed discharge resources and transportation as appropriate  - Identify discharge learning needs (meds, wound care, etc.)  - Arrange for interpretive services to assist at discharge as needed  - Refer to Case Management Department for coordinating discharge planning if the patient needs post-hospital services based on physician/advanced practitioner order or complex needs related to functional status, cognitive ability, or social support system  Outcome: Progressing     Problem: Knowledge Deficit  Goal: Patient/family/caregiver demonstrates understanding of disease process, treatment plan, medications, and discharge instructions  Description: Complete learning assessment and assess knowledge base. Interventions:  - Provide teaching at level of understanding  - Provide teaching via preferred learning methods  Outcome: Progressing     Problem: MOBILITY - ADULT  Goal: Maintain or return to baseline ADL function  Description: INTERVENTIONS:  -  Assess patient's ability to carry out ADLs; assess patient's baseline for ADL function and identify physical deficits which impact ability to perform ADLs (bathing, care of mouth/teeth, toileting, grooming, dressing, etc.)  - Assess/evaluate cause of self-care deficits   - Assess range of motion  - Assess patient's mobility; develop plan if impaired  - Assess patient's need for assistive devices and provide as appropriate  - Encourage maximum independence but intervene and supervise when necessary  - Involve family in performance of ADLs  - Assess for home care needs following discharge   - Consider OT consult to assist with ADL evaluation and planning for discharge  - Provide patient education as appropriate  Outcome: Progressing  Goal: Maintains/Returns to pre admission functional level  Description: INTERVENTIONS:  - Perform BMAT or MOVE assessment daily.   - Set and communicate daily mobility goal to care team and patient/family/caregiver.    - Collaborate with rehabilitation services on mobility goals if consulted    - Out of bed for toileting  - Record patient progress and toleration of activity level   Outcome: Progressing

## 2023-07-04 NOTE — DISCHARGE SUMMARY
Discharge Summary - Beverly Patel 62 y.o. female MRN: [id number]  Unit/Bed#: -01 Encounter: [id number]    Admission Date:   Admission Orders (From admission, onward)     Ordered        07/02/23 0511  INPATIENT ADMISSION  Once                      Admitting Diagnosis: Acute cholecystitis [K81.0]  Abdominal pain [R10.9]  RUQ abdominal pain [R10.11]    HPI: Per H&P Performed 7/2:  Beverly Patel is a 62 y.o. female with past medical history significant for HLD, anxiety, insomnia, with splenic laceration and IR embolization 1/2021 who initially presented to [facility] ED with complaints of RUQ pain. This began around 6PM following dinner. Patient reports she had associated nausea and vomiting without fevers or chills. She did experience some radiation of pain into the right shoulder. Patient feels since arrival she has had improvement in pain with pain medication. She states she hasn't had recent BM but has been urinating without issue. She denies any prior episodes of similar pain or hx of issues with gallbladder. Procedures Performed: CHOLECYSTECTOMY LAPAROSCOPIC    Summary of Hospital Course: Patient presented 7/2 with above complaint and above procedure performed with the below findings. Postop day 1 patient had been tolerating clear liquids and was able to be advanced to a regular diet although she felt she had minimal appetite. Her O2 sats were fluctuating from 90-95 on room air otherwise vital stable. Patient had some trouble with pain control overnight and continued to use pain medication resulting in her remaining inpatient another night. Postop day 2 she was tolerating a diet without nausea, vomiting, or increased abdominal pain. She continued to experience some "muscle spasms "but felt this was better controlled. Patient began to pass some flatus and was urinating without issue. She was deemed stable for discharge.   Discharge information reviewed including lifting restriction, shower instructions, and follow-up in about 2 weeks in outpatient office. 10 pills of oxycodone 5 were sent to pharmacy. All questions answered. Significant Findings, Care, Treatment and Services Provided: The patient was found to have acute on chronic calculous cholecystitis. Laparoscopic cholecystectomy was performed in the routine fashion.     During the course the dissection the critical structures defined. The cystic duct dissected out circumferentially. The cystic artery dissected out circumferentially.      The cholecystectomy completed laparoscopically. The patient tolerated the procedure well. Complications: None    Discharge Diagnosis: Acute cholecystitis K81.0  Abdominal pain R10.9  RUQ abdominal pain R10.11    Condition at Discharge: good     Discharge instructions/Information to patient and family:   See after visit summary for information provided to patient and family. Provisions for Follow-Up Care:  See after visit summary for information related to follow-up care and any pertinent home health orders. PCP: Rebeca Aguilar DO    Disposition: Home    Planned Readmission: No    Discharge Statement   I spent 15 minutes discharging the patient. This time was spent on the day of discharge. I had direct contact with the patient on the day of discharge. Additional documentation is required if more than 30 minutes were spent on discharge. Discharge Medications:  See after visit summary for reconciled discharge medications provided to patient and family.        [place], [place]  7/5/2023

## 2023-07-04 NOTE — NURSING NOTE
Reviewed discharge instructions with patient. Verbalized understanding of same. Belonging returned to patient. Reviewed surgical education. Questions answered.

## 2023-07-04 NOTE — PROGRESS NOTES
Progress Note - General Surgery   Cris Cooley 62 y.o. female MRN: [id number]  Unit/Bed#: -01 Encounter: [id number]    Assessment:  62 y.o. female POD2 s/p laparoscopic cholecystectomy  - Afebrile, VSS on room air, episodes of HTN  - No new labs  - Tolerating a little bit of regular diet   - Hx anxiety, insomnia, HLD  - Surghx splenic laceration with IR embolization 1/2021     Plan:  - Discharge planning for today following lunch  - Diet as tolerated  - Pain control PRN  - Discharge information reviewed including showering and lifting  - Patient to follow in outpatient office in about 2 weeks     Subjective:   Patient states she feels okay. She had a tough time sleeping last night and continues to experience some "muscle spasms." She denies any nausea or vomiting, no fevers or chills. Patient has been passing a little flatus and is urinating without issue. Objective:   Blood pressure 163/88, pulse 91, temperature 98.1 °F (36.7 °C), resp. rate 18, height 5' 2" (1.575 m), weight 75.8 kg (167 lb 3.2 oz), SpO2 94 %. ,Body mass index is 30.58 kg/m². Intake/Output Summary (Last 24 hours) at 7/4/2023 6151  Last data filed at 7/3/2023 1722  Gross per 24 hour   Intake 120 ml   Output --   Net 120 ml     Invasive Devices     Peripheral Intravenous Line  Duration           Peripheral IV 07/02/23 Left Antecubital 2 days              Physical Exam:  General: no acute distress, pt appears well and comfortable  Skin: warm and dry to touch  Pulmonary: normal effort  Abdominal: softly distended, active bowel sounds appreciated, minimally tender to palpation most significant in RUQ, no guarding or rebound, incision sites are clean/dry/intact without drainage or erythema, some ecchymosis noted  Neuro: alert and oriented     Lab, Imaging and other studies:I have personally reviewed pertinent lab results.   , CBC: No results found for: "WBC", "HGB", "HCT", "MCV", "PLT", "ADJUSTEDWBC", "RBC", "MCH", "MCHC", "RDW", "MPV", "NRBC", CMP: No results found for: "SODIUM", "K", "CL", "CO2", "ANIONGAP", "BUN", "CREATININE", "GLUCOSE", "CALCIUM", "AST", "ALT", "ALKPHOS", "PROT", "BILITOT", "EGFR"  VTE Pharmacologic Prophylaxis: Reason for no pharmacologic prophylaxis low risk  VTE Mechanical Prophylaxis: sequential compression device    My Alvarez PA-C  7/4/2023

## 2023-07-05 RX ORDER — OXYCODONE HYDROCHLORIDE 5 MG/1
5 TABLET ORAL EVERY 6 HOURS PRN
Qty: 10 TABLET | Refills: 0 | Status: SHIPPED | OUTPATIENT
Start: 2023-07-05

## 2023-07-05 NOTE — QUICK NOTE
Vipult received from office indicating patient had Oxycodone script sent to a [facility] out of state as opposed to in [place]. Checked preferred pharmacy which was marked as [facility]. Call placed and order has been cancelled by the pharmacy in [facility]. Called [place] [facility] who [place] not have a script for oxycodone for this patient. Placed new order for script - Oxy 5, 10 pills.  called by clinical coordinator and outpatient office made aware of same.     Sri Solorzano  7/5/2023

## 2023-07-08 PROCEDURE — 88304 TISSUE EXAM BY PATHOLOGIST: CPT | Performed by: PATHOLOGY

## 2023-07-17 ENCOUNTER — TELEPHONE (OUTPATIENT)
Dept: SURGERY | Facility: CLINIC | Age: 58
End: 2023-07-17

## 2023-07-17 NOTE — TELEPHONE ENCOUNTER
Patient contacted by phone. The technical details of the surgery were reviewed with the patient and all questions answered to their satisfaction.

## (undated) DEVICE — IRRIG ENDO FLO TUBING

## (undated) DEVICE — 4-PORT MANIFOLD: Brand: NEPTUNE 2

## (undated) DEVICE — ADHESIVE SKIN HIGH VISCOSITY EXOFIN 1ML

## (undated) DEVICE — HARMONIC 1100 SHEARS, 36CM SHAFT LENGTH: Brand: HARMONIC

## (undated) DEVICE — CHLORAPREP HI-LITE 26ML ORANGE

## (undated) DEVICE — ENDOPOUCH RETRIEVER SPECIMEN RETRIEVAL BAGS: Brand: ENDOPOUCH RETRIEVER

## (undated) DEVICE — SUT MONOCRYL 4-0 PS-2 27 IN Y426H

## (undated) DEVICE — GLOVE INDICATOR UNDERGLOVE SZ 7.5 GREEN

## (undated) DEVICE — [HIGH FLOW INSUFFLATOR,  DO NOT USE IF PACKAGE IS DAMAGED,  KEEP DRY,  KEEP AWAY FROM SUNLIGHT,  PROTECT FROM HEAT AND RADIOACTIVE SOURCES.]: Brand: PNEUMOSURE

## (undated) DEVICE — TROCAR: Brand: KII FIOS FIRST ENTRY

## (undated) DEVICE — GLOVE SRG BIOGEL 7

## (undated) DEVICE — LIGAMAX 5 MM ENDOSCOPIC MULTIPLE CLIP APPLIER: Brand: LIGAMAX

## (undated) DEVICE — GARMENT,MEDLINE,DVT,INT,CALF,FOAM,MED: Brand: MEDLINE

## (undated) DEVICE — INTENDED FOR TISSUE SEPARATION, AND OTHER PROCEDURES THAT REQUIRE A SHARP SURGICAL BLADE TO PUNCTURE OR CUT.: Brand: BARD-PARKER SAFETY BLADES SIZE 11, STERILE